# Patient Record
Sex: MALE | Race: BLACK OR AFRICAN AMERICAN | NOT HISPANIC OR LATINO | Employment: OTHER | URBAN - METROPOLITAN AREA
[De-identification: names, ages, dates, MRNs, and addresses within clinical notes are randomized per-mention and may not be internally consistent; named-entity substitution may affect disease eponyms.]

---

## 2019-09-09 ENCOUNTER — OFFICE VISIT (OUTPATIENT)
Dept: PRIMARY CARE CLINIC | Facility: CLINIC | Age: 49
End: 2019-09-09
Payer: COMMERCIAL

## 2019-09-09 VITALS
SYSTOLIC BLOOD PRESSURE: 128 MMHG | WEIGHT: 219.38 LBS | HEIGHT: 70 IN | BODY MASS INDEX: 31.41 KG/M2 | DIASTOLIC BLOOD PRESSURE: 80 MMHG | OXYGEN SATURATION: 96 % | TEMPERATURE: 98 F | HEART RATE: 75 BPM

## 2019-09-09 DIAGNOSIS — I10 ESSENTIAL HYPERTENSION: Primary | ICD-10-CM

## 2019-09-09 DIAGNOSIS — L30.9 DERMATITIS: ICD-10-CM

## 2019-09-09 DIAGNOSIS — Z12.11 SCREEN FOR COLON CANCER: ICD-10-CM

## 2019-09-09 DIAGNOSIS — Z00.00 ANNUAL PHYSICAL EXAM: ICD-10-CM

## 2019-09-09 PROCEDURE — 99999 PR PBB SHADOW E&M-NEW PATIENT-LVL III: ICD-10-PCS | Mod: PBBFAC,,, | Performed by: INTERNAL MEDICINE

## 2019-09-09 PROCEDURE — 99396 PR PREVENTIVE VISIT,EST,40-64: ICD-10-PCS | Mod: S$GLB,,, | Performed by: INTERNAL MEDICINE

## 2019-09-09 PROCEDURE — 99396 PREV VISIT EST AGE 40-64: CPT | Mod: S$GLB,,, | Performed by: INTERNAL MEDICINE

## 2019-09-09 PROCEDURE — 99999 PR PBB SHADOW E&M-NEW PATIENT-LVL III: CPT | Mod: PBBFAC,,, | Performed by: INTERNAL MEDICINE

## 2019-09-09 RX ORDER — IRBESARTAN AND HYDROCHLOROTHIAZIDE 150; 12.5 MG/1; MG/1
TABLET, FILM COATED ORAL
Refills: 1 | COMMUNITY
Start: 2019-08-01 | End: 2019-09-09 | Stop reason: SDUPTHER

## 2019-09-09 RX ORDER — IRBESARTAN AND HYDROCHLOROTHIAZIDE 150; 12.5 MG/1; MG/1
1 TABLET, FILM COATED ORAL DAILY
Qty: 90 TABLET | Refills: 3 | Status: SHIPPED | OUTPATIENT
Start: 2019-09-09 | End: 2020-09-23 | Stop reason: SDUPTHER

## 2019-09-09 RX ORDER — TRIAMCINOLONE ACETONIDE 1 MG/G
CREAM TOPICAL 2 TIMES DAILY
Qty: 80 G | Refills: 6 | Status: ON HOLD | OUTPATIENT
Start: 2019-09-09 | End: 2020-11-14 | Stop reason: HOSPADM

## 2019-09-09 NOTE — PROGRESS NOTES
[facility] Note    Chief Complaint      Chief Complaint   Patient presents with    Annual Exam    Rash     right leg       History of Present Illness      Matthew Phoenix is a 48 y.o. male with chronic conditions of HTN who presents today for: re-estabilsh care from  and annual preventative visit.  Complains of rash on right shin, pruritic present for the past few months.    HTN: BP at goal on irbesartan-hctz.    Diet: Prepares own food mostly.  LImiting fatty foods and carbs.  Drinks plenty water.  Exercise: Has been riding bike frequently.    Denies drinking and driving, drinking more than 4 drinks on occasion, drug use.      Past Medical History:  No past medical history on file.    Past Surgical History:   has no past surgical history on file.    Family History:  family history is not on file.     Social History:  Social History     Tobacco Use    Smoking status: Never Smoker   Substance Use Topics    Alcohol use: Yes    Drug use: Not on file       Review of Systems   Constitutional: Negative for chills, fever and malaise/fatigue.   Respiratory: Negative for shortness of breath.    Cardiovascular: Negative for chest pain.   Gastrointestinal: Negative for constipation, diarrhea, nausea and vomiting.   Skin: Negative for rash.   Neurological: Negative for weakness.        Medications:  Outpatient Encounter Medications as of 9/9/2019   Medication Sig Dispense Refill    irbesartan-hydrochlorothiazide (AVALIDE) 150-12.5 mg per tablet Take 1 tablet by mouth once daily. 90 tablet 3    [DISCONTINUED] irbesartan-hydrochlorothiazide (AVALIDE) 150-12.5 mg per tablet TK 1 T PO D  1    triamcinolone acetonide 0.1% (KENALOG) 0.1 % cream Apply topically 2 (two) times daily. 80 g 6     No facility-administered encounter medications on file as of 9/9/2019.        Allergies:  Review of patient's allergies indicates:   Allergen Reactions    Penicillins        Health Maintenance:    There is no  "immunization history on file for this patient.   Health Maintenance   Topic Date Due    Lipid Panel  1970    TETANUS VACCINE  10/06/1987      Flu shot declines.  Td 5-6 yrs ago.  Shingles vaccine due age 59. Pneumonia vaccine due age 64.    Cscope due at age 49.    Physical Exam      Vital Signs  Temp: 98.1 °F (36.7 °C)  Temp src: Oral  Pulse: 75  SpO2: 96 %  BP: 128/80  BP Location: Right arm  Patient Position: Sitting  Height and Weight  Height: 5' 10" (177.8 cm)  Weight: 99.5 kg (219 lb 5.7 oz)  BSA (Calculated - sq m): 2.22 sq meters  BMI (Calculated): 31.5  Weight in (lb) to have BMI = 25: 173.9]    Physical Exam   Constitutional: He appears well-developed and well-nourished.   HENT:   Head: Normocephalic and atraumatic.   Right Ear: External ear normal.   Left Ear: External ear normal.   Mouth/Throat: Oropharynx is clear and moist.   Eyes: Pupils are equal, round, and reactive to light. Conjunctivae and EOM are normal.   Cardiovascular: Normal rate, regular rhythm, normal heart sounds and intact distal pulses.   No murmur heard.  Pulmonary/Chest: Effort normal and breath sounds normal. He has no wheezes. He has no rales.   Abdominal: Soft. Bowel sounds are normal. He exhibits no distension and no abdominal bruit. There is no hepatosplenomegaly. There is no tenderness.   Skin: Rash (hyperpigmented plaque with areas of excoriation, right anterior shin) noted.   Vitals reviewed.       Laboratory:  CBC:      CMP:        Invalid input(s): CREATININ  URINALYSIS:       LIPIDS:      TSH:      A1C:        Assessment/Plan     Matthew Phoenix is a 48 y.o.male with:    1. Annual physical exam  - irbesartan-hydrochlorothiazide (AVALIDE) 150-12.5 mg per tablet; Take 1 tablet by mouth once daily.  Dispense: 90 tablet; Refill: 3  - CBC auto differential; Future  - Comprehensive metabolic panel; Future  - Lipid panel; Future  - PSA, Screening; Future  - TSH; Future  - T4, free; Future  - CBC auto differential  - " Comprehensive metabolic panel  - Lipid panel  - PSA, Screening  - TSH  - T4, free  Discussed diet and exercise, vaccines and cancer screening, risk factors.  Screening labs ordered.     2. Essential hypertension  - irbesartan-hydrochlorothiazide (AVALIDE) 150-12.5 mg per tablet; Take 1 tablet by mouth once daily.  Dispense: 90 tablet; Refill: 3  - CBC auto differential; Future  - Comprehensive metabolic panel; Future  - Lipid panel; Future  - PSA, Screening; Future  - TSH; Future  - T4, free; Future  - CBC auto differential  - Comprehensive metabolic panel  - Lipid panel  - PSA, Screening  - TSH  - T4, free  Continue current meds.    3. Dermatitis  - triamcinolone acetonide 0.1% (KENALOG) 0.1 % cream; Apply topically 2 (two) times daily.  Dispense: 80 g; Refill: 6      Chronic conditions status updated as per HPI.  Other than changes above, cont current medications and maintain follow up with specialists.  Return to clinic in 12 months.    Jordi White MD  [facility]

## 2019-09-20 ENCOUNTER — TELEPHONE (OUTPATIENT)
Dept: PRIMARY CARE CLINIC | Facility: CLINIC | Age: 49
End: 2019-09-20

## 2019-09-20 DIAGNOSIS — I10 ESSENTIAL HYPERTENSION: ICD-10-CM

## 2019-09-20 DIAGNOSIS — Z00.00 ANNUAL PHYSICAL EXAM: Primary | ICD-10-CM

## 2019-11-15 ENCOUNTER — TELEPHONE (OUTPATIENT)
Dept: FAMILY MEDICINE | Facility: CLINIC | Age: 49
End: 2019-11-15

## 2019-11-15 ENCOUNTER — TELEPHONE (OUTPATIENT)
Dept: PRIMARY CARE CLINIC | Facility: CLINIC | Age: 49
End: 2019-11-15

## 2019-11-15 DIAGNOSIS — L30.9 DERMATITIS: Primary | ICD-10-CM

## 2020-01-06 ENCOUNTER — INITIAL CONSULT (OUTPATIENT)
Dept: DERMATOLOGY | Facility: CLINIC | Age: 50
End: 2020-01-06
Payer: COMMERCIAL

## 2020-01-06 DIAGNOSIS — L28.0 LSC (LICHEN SIMPLEX CHRONICUS): Primary | ICD-10-CM

## 2020-01-06 PROCEDURE — 99999 PR PBB SHADOW E&M-EST. PATIENT-LVL III: CPT | Mod: PBBFAC,,, | Performed by: PHYSICIAN ASSISTANT

## 2020-01-06 PROCEDURE — 99999 PR PBB SHADOW E&M-EST. PATIENT-LVL III: ICD-10-PCS | Mod: PBBFAC,,, | Performed by: PHYSICIAN ASSISTANT

## 2020-01-06 PROCEDURE — 99202 PR OFFICE/OUTPT VISIT, NEW, LEVL II, 15-29 MIN: ICD-10-PCS | Mod: S$GLB,,, | Performed by: PHYSICIAN ASSISTANT

## 2020-01-06 PROCEDURE — 99202 OFFICE O/P NEW SF 15 MIN: CPT | Mod: S$GLB,,, | Performed by: PHYSICIAN ASSISTANT

## 2020-01-06 NOTE — PATIENT INSTRUCTIONS
Discussed with patient the importance of not manipulating skin lesions. Trauma often exacerbates condition. Trimming nails is recommended to avoid puncturing skin.     XEROSIS (DRY SKIN)        1. Definition    Xerosis is the term for dry skin.  We all have a natural oil coating over our skin produced by the skin oil glands.  If this oil is removed, the skin becomes dry which can lead to cracking, which can lead to inflammation.  Xerosis is usually a long-term problem that recurs often, especially in the winter.    2. Cause     Long hot baths or showers can remove our natural oil and lead to xerosis.  One should never take more than one bath or shower a day and for no longer than ten minutes.   Use of harsh soaps such as Zest, Dial, and Ivory can worsen and cause xerosis.   Cold winter weather worsens xerosis because the amount of moisture contained in cold air is much less than the amount of moisture in warm air.    3. Treatment     Treatment is intended to restore the natural oil to your skin.  Keep the skin lubricated.     Do not take more than one bath or shower a day.  Use lukewarm water, not hot.  Hot water dries out the skin.     Use a gentle moisturizing soap such as Cetaphil soap, Oil of Olay, Dove, Basis, Ivory moisture care, Restoraderm cleanser.     When toweling dry, dont rub.  Blot the skin so there is still some water left on the skin.  You should apply a moisturizing cream to all of the skin such as Cerave cream, Cetaphil cream, Restoraderm or Eucerin Original Formula cream.   Alpha hydroxyacid lotions, i.e., AmLactin, also work very well for preventing dry skin, but may burn when used on inflamed or reddened skin.     If you like to swim during the winter months, you should not use soap when getting out of the pool.  When you have finished swimming, rinse off the chlorine with cool to warm water.  If this will be the only shower of the day, then you may use Cetaphil or another mild soap to  cleanse your skin.  After the shower, apply a moisturizing cream to all of the skin as above.        [address] [place] [address]/ [phone] FAX/ [email or web address]

## 2020-01-06 NOTE — PROGRESS NOTES
Subjective:       Patient ID:  Matthew Phoenix is a 49 y.o. male who presents for   Chief Complaint   Patient presents with    Eczema     Pt presents today for dark pigmented spots lower legs, x 1 month, itching at times, Tx. peroxide, alcohol      Rash  - Initial  Affected locations: right lower leg  Duration: 5 months  Signs / symptoms: itching and dryness (darker than surrounding skin)  Timing: worse at night  Aggravated by: scratching (admits to habit of scratching with his other foot when laying in bed nightly)  Treatments tried: alcohol and peroxide qhs, was rx'd TAC crm - used bid x few months without improvement.    Pt showers bid with hot water and Dove soap. Does not moisturize.    Review of Systems   Constitutional: Negative for fever and chills.   Skin: Positive for itching, rash and dry skin.        No hx of AD or psoriasis   Hematologic/Lymphatic: Does not bruise/bleed easily.        Objective:    Physical Exam   Constitutional: He appears well-developed and well-nourished. No distress.   Neurological: He is alert and oriented to person, place, and time. He is not disoriented.   Psychiatric: He has a normal mood and affect.   Skin:   Areas Examined (abnormalities noted in diagram):   RLE Inspected  LLE Inspection Performed              Diagram Legend     Erythematous scaling macule/papule c/w actinic keratosis       Vascular papule c/w angioma      Pigmented verrucoid papule/plaque c/w seborrheic keratosis      Yellow umbilicated papule c/w sebaceous hyperplasia      Irregularly shaped tan macule c/w lentigo     1-2 mm smooth white papules consistent with Milia      Movable subcutaneous cyst with punctum c/w epidermal inclusion cyst      Subcutaneous movable cyst c/w pilar cyst      Firm pink to brown papule c/w dermatofibroma      Pedunculated fleshy papule(s) c/w skin tag(s)      Evenly pigmented macule c/w junctional nevus     Mildly variegated pigmented, slightly irregular-bordered macule c/w  mildly atypical nevus      Flesh colored to evenly pigmented papule c/w intradermal nevus       Pink pearly papule/plaque c/w basal cell carcinoma      Erythematous hyperkeratotic cursted plaque c/w SCC      Surgical scar with no sign of skin cancer recurrence      Open and closed comedones      Inflammatory papules and pustules      Verrucoid papule consistent consistent with wart     Erythematous eczematous patches and plaques     Dystrophic onycholytic nail with subungual debris c/w onychomycosis     Umbilicated papule    Erythematous-base heme-crusted tan verrucoid plaque consistent with inflamed seborrheic keratosis     Erythematous Silvery Scaling Plaque c/w Psoriasis     See annotation    Assessment / Plan:      LSC (lichen simplex chronicus)  Good skin care regimen discussed including limiting to one bath or shower/day, using lukewarm water with mild soap and moisturizing cream (CeraVe) to skin 1 - 2x/day. Brochure was provided and reviewed with patient.    Discussed with patient the importance of not manipulating skin lesions. Trauma often exacerbates condition. Trimming nails is recommended to avoid puncturing skin.     D/c alcohol and peroxide to the area.         Follow up if symptoms worsen or fail to improve.

## 2020-01-06 NOTE — LETTER
January 6, 2020      Jordi White MD  [address] [address]  [address]  [facility] [place] [address]           [facility]  [address]  [place] [address]  Phone: [phone]  Fax: [phone]          Patient: Matthew Phoenix   MR Number: [id number]   YOB: 1970   Date of Visit: 1/6/2020       Dear Dr. Jordi White:    Thank you for referring Matthew Phoenix to me for evaluation. Attached you will find relevant portions of my assessment and plan of care.    If you have questions, please do not hesitate to call me. I look forward to following Matthew Phoenix along with you.    Sincerely,    Temi Mason PA-C    Enclosure  CC:  No Recipients    If you would like to receive this communication electronically, please contact [email or web address] or [phone] to request more information on [employer] access.    For providers and/or their staff who would like to refer a patient to [facility], please contact us through our one-stop-shop provider referral line, [facility], at [phone].    If you feel you have received this communication in error or would no longer like to receive these types of communications, please e-mail [email or web address]

## 2020-03-04 LAB
ALBUMIN SERPL-MCNC: 4.5 G/DL (ref 4–5)
ALBUMIN/GLOB SERPL: 1.7 {RATIO} (ref 1.2–2.2)
ALP SERPL-CCNC: 53 IU/L (ref 39–117)
ALT SERPL-CCNC: 22 IU/L (ref 0–44)
AST SERPL-CCNC: 24 IU/L (ref 0–40)
BASOPHILS # BLD AUTO: 0 X10E3/UL (ref 0–0.2)
BASOPHILS NFR BLD AUTO: 0 %
BILIRUB SERPL-MCNC: 0.5 MG/DL (ref 0–1.2)
BUN SERPL-MCNC: 14 MG/DL (ref 6–24)
BUN/CREAT SERPL: 13 (ref 9–20)
CALCIUM SERPL-MCNC: 9.1 MG/DL (ref 8.7–10.2)
CHLORIDE SERPL-SCNC: 104 MMOL/L (ref 96–106)
CHOLEST SERPL-MCNC: 197 MG/DL (ref 100–199)
CO2 SERPL-SCNC: 19 MMOL/L (ref 20–29)
CREAT SERPL-MCNC: 1.07 MG/DL (ref 0.76–1.27)
EOSINOPHIL # BLD AUTO: 0.1 X10E3/UL (ref 0–0.4)
EOSINOPHIL NFR BLD AUTO: 2 %
ERYTHROCYTE [DISTWIDTH] IN BLOOD BY AUTOMATED COUNT: 15.1 % (ref 11.6–15.4)
GLOBULIN SER CALC-MCNC: 2.7 G/DL (ref 1.5–4.5)
GLUCOSE SERPL-MCNC: 117 MG/DL (ref 65–99)
HCT VFR BLD AUTO: 43.6 % (ref 37.5–51)
HDLC SERPL-MCNC: 45 MG/DL
HGB BLD-MCNC: 14.3 G/DL (ref 13–17.7)
IMM GRANULOCYTES # BLD AUTO: 0 X10E3/UL (ref 0–0.1)
IMM GRANULOCYTES NFR BLD AUTO: 0 %
LDLC SERPL CALC-MCNC: 130 MG/DL (ref 0–99)
LYMPHOCYTES # BLD AUTO: 1.9 X10E3/UL (ref 0.7–3.1)
LYMPHOCYTES NFR BLD AUTO: 47 %
MCH RBC QN AUTO: 29.6 PG (ref 26.6–33)
MCHC RBC AUTO-ENTMCNC: 32.8 G/DL (ref 31.5–35.7)
MCV RBC AUTO: 90 FL (ref 79–97)
MONOCYTES # BLD AUTO: 0.4 X10E3/UL (ref 0.1–0.9)
MONOCYTES NFR BLD AUTO: 10 %
NEUTROPHILS # BLD AUTO: 1.7 X10E3/UL (ref 1.4–7)
NEUTROPHILS NFR BLD AUTO: 41 %
PLATELET # BLD AUTO: 196 X10E3/UL (ref 150–450)
POTASSIUM SERPL-SCNC: 4.1 MMOL/L (ref 3.5–5.2)
PROT SERPL-MCNC: 7.2 G/DL (ref 6–8.5)
PSA SERPL-MCNC: 1.2 NG/ML (ref 0–4)
RBC # BLD AUTO: 4.83 X10E6/UL (ref 4.14–5.8)
SODIUM SERPL-SCNC: 141 MMOL/L (ref 134–144)
T4 FREE SERPL-MCNC: 1.18 NG/DL (ref 0.82–1.77)
TRIGL SERPL-MCNC: 108 MG/DL (ref 0–149)
TSH SERPL DL<=0.005 MIU/L-ACNC: 5.13 UIU/ML (ref 0.45–4.5)
VLDLC SERPL CALC-MCNC: 22 MG/DL (ref 5–40)
WBC # BLD AUTO: 4.1 X10E3/UL (ref 3.4–10.8)

## 2020-03-09 ENCOUNTER — OFFICE VISIT (OUTPATIENT)
Dept: PRIMARY CARE CLINIC | Facility: CLINIC | Age: 50
End: 2020-03-09
Payer: COMMERCIAL

## 2020-03-09 VITALS
HEART RATE: 70 BPM | RESPIRATION RATE: 18 BRPM | WEIGHT: 217.81 LBS | OXYGEN SATURATION: 98 % | TEMPERATURE: 98 F | HEIGHT: 70 IN | BODY MASS INDEX: 31.18 KG/M2 | DIASTOLIC BLOOD PRESSURE: 65 MMHG | SYSTOLIC BLOOD PRESSURE: 135 MMHG

## 2020-03-09 DIAGNOSIS — L28.0 LICHEN SIMPLEX CHRONICUS: ICD-10-CM

## 2020-03-09 DIAGNOSIS — I10 ESSENTIAL HYPERTENSION: Primary | ICD-10-CM

## 2020-03-09 DIAGNOSIS — R73.01 IMPAIRED FASTING GLUCOSE: ICD-10-CM

## 2020-03-09 PROCEDURE — 99999 PR PBB SHADOW E&M-EST. PATIENT-LVL III: CPT | Mod: PBBFAC,,, | Performed by: INTERNAL MEDICINE

## 2020-03-09 PROCEDURE — 99999 PR PBB SHADOW E&M-EST. PATIENT-LVL III: ICD-10-PCS | Mod: PBBFAC,,, | Performed by: INTERNAL MEDICINE

## 2020-03-09 PROCEDURE — 99214 PR OFFICE/OUTPT VISIT, EST, LEVL IV, 30-39 MIN: ICD-10-PCS | Mod: S$GLB,,, | Performed by: INTERNAL MEDICINE

## 2020-03-09 PROCEDURE — 3075F PR MOST RECENT SYSTOLIC BLOOD PRESS GE 130-139MM HG: ICD-10-PCS | Mod: CPTII,S$GLB,, | Performed by: INTERNAL MEDICINE

## 2020-03-09 PROCEDURE — 3008F PR BODY MASS INDEX (BMI) DOCUMENTED: ICD-10-PCS | Mod: CPTII,S$GLB,, | Performed by: INTERNAL MEDICINE

## 2020-03-09 PROCEDURE — 99214 OFFICE O/P EST MOD 30 MIN: CPT | Mod: S$GLB,,, | Performed by: INTERNAL MEDICINE

## 2020-03-09 PROCEDURE — 3075F SYST BP GE 130 - 139MM HG: CPT | Mod: CPTII,S$GLB,, | Performed by: INTERNAL MEDICINE

## 2020-03-09 PROCEDURE — 3078F DIAST BP <80 MM HG: CPT | Mod: CPTII,S$GLB,, | Performed by: INTERNAL MEDICINE

## 2020-03-09 PROCEDURE — 3078F PR MOST RECENT DIASTOLIC BLOOD PRESSURE < 80 MM HG: ICD-10-PCS | Mod: CPTII,S$GLB,, | Performed by: INTERNAL MEDICINE

## 2020-03-09 PROCEDURE — 3008F BODY MASS INDEX DOCD: CPT | Mod: CPTII,S$GLB,, | Performed by: INTERNAL MEDICINE

## 2020-03-09 NOTE — PROGRESS NOTES
[facility] Note    Chief Complaint      Chief Complaint   Patient presents with    Follow-up       History of Present Illness      Matthew Phoenix is a 49 y.o. male with chronic conditions of HTN who presents today for: follow up chronic conditions and review abnormal labs.  Had labs which showed elevated blood sugar (117).  Has never been diagnosed with diabetes previously.    HTN: BP at goal on irbesartan-hctz.  Lichen simplex: Saw Dr. Mason.  Started on cerave cream which is improving.    Flu shot declines.  Td 5-6 yrs ago.  Shingles vaccine due age 59. Pneumonia vaccine due age 64.    Cscope due at age 49.    Past Medical History:  History reviewed. No pertinent past medical history.    Past Surgical History:   has no past surgical history on file.    Family History:  family history includes Eczema in his daughter.     Social History:  Social History     Tobacco Use    Smoking status: Never Smoker   Substance Use Topics    Alcohol use: Yes    Drug use: Not on file       Review of Systems   Constitutional: Negative for chills, fever and malaise/fatigue.   Respiratory: Negative for shortness of breath.    Cardiovascular: Negative for chest pain.   Gastrointestinal: Negative for constipation, diarrhea, nausea and vomiting.   Skin: Negative for rash.   Neurological: Negative for weakness.        Medications:  Outpatient Encounter Medications as of 3/9/2020   Medication Sig Dispense Refill    irbesartan-hydrochlorothiazide (AVALIDE) 150-12.5 mg per tablet Take 1 tablet by mouth once daily. 90 tablet 3    triamcinolone acetonide 0.1% (KENALOG) 0.1 % cream Apply topically 2 (two) times daily. (Patient not taking: Reported on 1/6/2020) 80 g 6     No facility-administered encounter medications on file as of 3/9/2020.        Allergies:  Review of patient's allergies indicates:   Allergen Reactions    Penicillins        Health Maintenance:    There is no immunization history on file for this  "patient.   Health Maintenance   Topic Date Due    TETANUS VACCINE  10/06/1987    Lipid Panel  03/03/2025    Colonoscopy  10/28/2029        Physical Exam      Vital Signs  Temp: 98 °F (36.7 °C)  Temp src: Oral  Pulse: 70  Resp: 18  SpO2: 98 %  BP: 135/65  BP Location: Left arm  Patient Position: Sitting  Pain Score: 0-No pain  Height and Weight  Height: 5' 10" (177.8 cm)  Weight: 98.8 kg (217 lb 13 oz)  BSA (Calculated - sq m): 2.21 sq meters  BMI (Calculated): 31.3  Weight in (lb) to have BMI = 25: 173.9]    Physical Exam   Constitutional: He appears well-developed and well-nourished.   HENT:   Head: Normocephalic and atraumatic.   Right Ear: External ear normal.   Left Ear: External ear normal.   Mouth/Throat: Oropharynx is clear and moist.   Eyes: Pupils are equal, round, and reactive to light. Conjunctivae and EOM are normal.   Cardiovascular: Normal rate, regular rhythm, normal heart sounds and intact distal pulses.   No murmur heard.  Pulmonary/Chest: Effort normal and breath sounds normal. He has no wheezes. He has no rales.   Abdominal: Soft. Bowel sounds are normal. He exhibits no distension and no abdominal bruit. There is no hepatosplenomegaly. There is no tenderness.   Vitals reviewed.       Laboratory:  CBC:  Recent Labs   Lab 03/03/20  0636   WBC 4.1   RBC 4.83   Hemoglobin 14.3   Hematocrit 43.6   Platelets 196   Mean Corpuscular Volume 90   Mean Corpuscular Hemoglobin 29.6   Mean Corpuscular Hemoglobin Conc 32.8     CMP:  Recent Labs   Lab 03/03/20  0636   Glucose 117 H   Calcium 9.1   Albumin 4.5   Total Protein 7.2   Sodium 141   Potassium 4.1   CO2 19 L   Chloride 104   BUN, Bld 14   Alkaline Phosphatase 53   ALT 22   AST 24   Total Bilirubin 0.5     URINALYSIS:       LIPIDS:  Recent Labs   Lab 03/03/20  0636   TSH 5.130 H   HDL 45   Cholesterol 197   Triglycerides 108   LDL Calculated 130 H     TSH:  Recent Labs   Lab 03/03/20  0636   TSH 5.130 H     A1C:        Assessment/Plan     Sachin" Phoenix is a 49 y.o.male with:    1. Essential hypertension  Continue current meds.    2. Impaired fasting glucose  - Hemoglobin A1c; Future  - Hemoglobin A1c  Will check A1C.  Counseled on diet.  3. Lichen simplex chronicus  Doing well on cerave.  Cont current treatment.    Chronic conditions status updated as per HPI.  Other than changes above, cont current medications and maintain follow up with specialists.  Return to clinic in 6 months.    Jordi White MD  [facility]

## 2020-03-12 ENCOUNTER — TELEPHONE (OUTPATIENT)
Dept: FAMILY MEDICINE | Facility: CLINIC | Age: 50
End: 2020-03-12

## 2020-03-12 DIAGNOSIS — R05.9 COUGH: Primary | ICD-10-CM

## 2020-03-12 NOTE — TELEPHONE ENCOUNTER
Spoke with patient. Pt calling in complains of nonproductive dry cough, x1 week. Pt states that he has only tried Nyquil OTC. Pt states that he would like something called in.   Informed patient that I would forward this information to Dr White and once he responds someone would give him a call back. Pt verbalizes understanding.

## 2020-03-12 NOTE — TELEPHONE ENCOUNTER
----- Message from Talon Hector sent at 3/12/2020  4:11 PM CDT -----  Contact: Ross (wife)[phone]  Ross state the patient have a dry cough and need to speak to the nurse about it. Please call and advise.

## 2020-03-13 RX ORDER — METHYLPREDNISOLONE 4 MG/1
TABLET ORAL
Qty: 1 PACKAGE | Refills: 0 | Status: SHIPPED | OUTPATIENT
Start: 2020-03-13 | End: 2020-04-03

## 2020-06-07 ENCOUNTER — HOSPITAL ENCOUNTER (EMERGENCY)
Facility: HOSPITAL | Age: 50
Discharge: HOME | End: 2020-06-07
Attending: EMERGENCY MEDICINE
Payer: COMMERCIAL

## 2020-06-07 VITALS
BODY MASS INDEX: 32.14 KG/M2 | TEMPERATURE: 99 F | OXYGEN SATURATION: 98 % | RESPIRATION RATE: 18 BRPM | HEIGHT: 69 IN | SYSTOLIC BLOOD PRESSURE: 170 MMHG | HEART RATE: 97 BPM | WEIGHT: 217 LBS | DIASTOLIC BLOOD PRESSURE: 94 MMHG

## 2020-06-07 DIAGNOSIS — M79.605 LEG PAIN, BILATERAL: Primary | ICD-10-CM

## 2020-06-07 DIAGNOSIS — M79.604 LEG PAIN, BILATERAL: Primary | ICD-10-CM

## 2020-06-07 PROCEDURE — 99284 EMERGENCY DEPT VISIT MOD MDM: CPT | Mod: 25

## 2020-06-07 PROCEDURE — 96372 THER/PROPH/DIAG INJ SC/IM: CPT

## 2020-06-07 PROCEDURE — 99284 PR EMERGENCY DEPT VISIT,LEVEL IV: ICD-10-PCS | Mod: ,,, | Performed by: PHYSICIAN ASSISTANT

## 2020-06-07 PROCEDURE — 99284 EMERGENCY DEPT VISIT MOD MDM: CPT | Mod: ,,, | Performed by: PHYSICIAN ASSISTANT

## 2020-06-07 PROCEDURE — 25000003 PHARM REV CODE 250: Performed by: PHYSICIAN ASSISTANT

## 2020-06-07 PROCEDURE — 63600175 PHARM REV CODE 636 W HCPCS: Performed by: PHYSICIAN ASSISTANT

## 2020-06-07 RX ORDER — METHOCARBAMOL 500 MG/1
1000 TABLET, FILM COATED ORAL 3 TIMES DAILY PRN
Qty: 26 TABLET | Refills: 0 | Status: SHIPPED | OUTPATIENT
Start: 2020-06-07 | End: 2020-06-12

## 2020-06-07 RX ORDER — NAPROXEN 500 MG/1
500 TABLET ORAL 2 TIMES DAILY WITH MEALS
Qty: 30 TABLET | Refills: 0 | Status: ON HOLD | OUTPATIENT
Start: 2020-06-07 | End: 2020-11-14 | Stop reason: HOSPADM

## 2020-06-07 RX ORDER — ACETAMINOPHEN 500 MG
1000 TABLET ORAL
Status: COMPLETED | OUTPATIENT
Start: 2020-06-07 | End: 2020-06-07

## 2020-06-07 RX ORDER — METHOCARBAMOL 500 MG/1
1000 TABLET, FILM COATED ORAL 3 TIMES DAILY PRN
Qty: 26 TABLET | Refills: 0 | Status: SHIPPED | OUTPATIENT
Start: 2020-06-07 | End: 2020-06-07 | Stop reason: SDUPTHER

## 2020-06-07 RX ORDER — ACETAMINOPHEN 500 MG
1000 TABLET ORAL
Status: DISCONTINUED | OUTPATIENT
Start: 2020-06-07 | End: 2020-06-07 | Stop reason: HOSPADM

## 2020-06-07 RX ORDER — KETOROLAC TROMETHAMINE 30 MG/ML
15 INJECTION, SOLUTION INTRAMUSCULAR; INTRAVENOUS
Status: COMPLETED | OUTPATIENT
Start: 2020-06-07 | End: 2020-06-07

## 2020-06-07 RX ORDER — METHOCARBAMOL 750 MG/1
1500 TABLET, FILM COATED ORAL
Status: COMPLETED | OUTPATIENT
Start: 2020-06-07 | End: 2020-06-07

## 2020-06-07 RX ORDER — NAPROXEN 500 MG/1
500 TABLET ORAL 2 TIMES DAILY WITH MEALS
Qty: 30 TABLET | Refills: 0 | Status: SHIPPED | OUTPATIENT
Start: 2020-06-07 | End: 2020-06-07 | Stop reason: SDUPTHER

## 2020-06-07 RX ADMIN — ACETAMINOPHEN 1000 MG: 500 TABLET ORAL at 04:06

## 2020-06-07 RX ADMIN — METHOCARBAMOL TABLETS 1500 MG: 750 TABLET, COATED ORAL at 04:06

## 2020-06-07 RX ADMIN — KETOROLAC TROMETHAMINE 15 MG: 30 INJECTION, SOLUTION INTRAMUSCULAR at 04:06

## 2020-06-07 NOTE — ED TRIAGE NOTES
Pt to ED with c/o of bilateral lower back and leg pain that started 2days ago. Pt stated its increasingly getting worse.

## 2020-06-07 NOTE — ED NOTES
LOC: The patient is awake, alert, and oriented to place, time, situation. Affect is appropriate.  Speech is appropriate and clear.     APPEARANCE: Patient resting comfortably in no acute distress.  Patient is clean and well groomed.    SKIN: The skin is warm and dry; color consistent with ethnicity.  Patient has normal skin turgor and moist mucus membranes.  Skin intact; no breakdown or bruising noted.     MUSCULOSKELETAL: Patient moving upper and lower extremities without difficulty.  Pt c/o bilateral leg pain.     RESPIRATORY: Airway is open and patent. Respirations spontaneous, even, easy, and non-labored.  Patient has a normal effort and rate.  No accessory muscle use noted. Denies cough.     CARDIAC:  Normal rhythm and rate noted.  No peripheral edema noted. No complaints of chest pain.      ABDOMEN: Soft and non tender to palpation.  No distention noted.     NEUROLOGIC: Eyes open spontaneously.  Behavior appropriate to situation.  Follows commands; facial expression symmetrical.  Purposeful motor response noted; normal sensation in all extremities.

## 2020-06-07 NOTE — ED PROVIDER NOTES
"Encounter Date: 6/7/2020       History     Chief Complaint   Patient presents with    Back Pain     lower back pain since last night and pain in both legs. no trauma or fall      49-year-old male with hypertension presents for pain in his bilateral posterior thighs for 1 day.  He is unable to describe the pain states this "just hurts".  He denies any provoking factors, no trauma, new exercises or bending.  He took a Flexeril prior to arrival which did improve his symptoms.  Pain is worse with bending forward.  He denies any associated symptoms, no back pain, numbness/tingling/weakness, leg swelling, abdominal pain or fever.  No loss of bowel or bladder function, no groin numbness.  He is able to ambulate without difficulty.        Review of patient's allergies indicates:   Allergen Reactions    Penicillins      PMH:  Hypertension  History reviewed. No pertinent surgical history.  Family History   Problem Relation Age of Onset    Eczema Daughter      Social History     Tobacco Use    Smoking status: Never Smoker   Substance Use Topics    Alcohol use: Yes    Drug use: Not on file     Review of Systems   Constitutional: Negative for fever.   HENT: Negative for sore throat.    Respiratory: Negative for shortness of breath.    Cardiovascular: Negative for chest pain.   Gastrointestinal: Negative for nausea.   Genitourinary: Negative for dysuria.   Musculoskeletal: Positive for myalgias. Negative for back pain, gait problem and joint swelling.   Skin: Negative for rash.   Neurological: Negative for weakness.   Hematological: Does not bruise/bleed easily.       Physical Exam     Initial Vitals [06/07/20 1457]   BP Pulse Resp Temp SpO2   (!) 170/94 97 18 99.3 °F (37.4 °C) 98 %      MAP       --         Physical Exam    Nursing note and vitals reviewed.  Constitutional: He appears well-developed and well-nourished. He is not diaphoretic. No distress.   HENT:   Head: Normocephalic and atraumatic.   Eyes: EOM are normal. " Pupils are equal, round, and reactive to light.   Neck: Normal range of motion. Neck supple.   Cardiovascular: Normal rate, regular rhythm, normal heart sounds and intact distal pulses. Exam reveals no gallop and no friction rub.    No murmur heard.  Pulmonary/Chest: Breath sounds normal. No respiratory distress. He has no wheezes. He has no rales. He exhibits no tenderness.   Musculoskeletal: Normal range of motion.   No posterior midline tenderness to palpation of C, T or L-spine.  Normal appearing back and bilateral thighs.  No swelling, skin changes or tenderness   Neurological: He is alert and oriented to person, place, and time. He has normal strength. No sensory deficit.   Skin: Skin is warm and dry.   Psychiatric: He has a normal mood and affect.         ED Course   Procedures  Labs Reviewed - No data to display       Imaging Results    None          Medical Decision Making:   History:   Old Medical Records: I decided to obtain old medical records.  Old Records Summarized: records from clinic visits.       <> Summary of Records: No recent ED visits or admissions  Initial Assessment:   49-year-old male presenting for atraumatic pain in his bilateral posterior thighs.  He is hypertensive 170/94 with otherwise normal vitals.  He has no tenderness to his back or thighs or any skin changes or swelling.  Normal strength and sensation.  Differential Diagnosis:   Muscle strain  Spinal stenosis  No weakness or numbness to suggest neurological compromise  No shooting electric pain is suggest sciatica  ED Management:  Will give Toradol, Tylenol, Robaxin and discharge with naproxen and Robaxin.    Instructed the patient to follow up with his PCP and return to the ED for any worsening symptoms. Stressed the importance of follow-up, strict ED return precautions given.  Patient voiced understanding and is comfortable with discharge.                                 Clinical Impression:       ICD-10-CM ICD-9-CM   1. Leg  pain, bilateral M79.604 729.5    M79.605          Disposition:   Disposition: Discharged  Condition: Stable                        Merary House PA-C  06/07/20 0679

## 2020-06-07 NOTE — DISCHARGE INSTRUCTIONS
Diagnosis:  Leg pain    I suspect that the pain in your legs may be due to a strained muscle or potentially narrowing of your spinal cord.  I am prescribing anti-inflammatory medicine as well as muscle relaxants.  You should also take Tylenol in addition to this, up to 3 g daily which is 6 of the 500 mg extra strength tablets.  Do not take muscle relaxants if you are driving or drinking alcohol.  The best to keep stretching to help relax your muscles.    Please schedule a follow-up appointment with your primary care doctor within the next week.  If you start to have weakness or numbness in your legs or loss of bowel or bladder function, please return to the emergency department.

## 2020-09-14 ENCOUNTER — OFFICE VISIT (OUTPATIENT)
Dept: PRIMARY CARE CLINIC | Facility: CLINIC | Age: 50
End: 2020-09-14
Payer: COMMERCIAL

## 2020-09-14 VITALS
DIASTOLIC BLOOD PRESSURE: 102 MMHG | SYSTOLIC BLOOD PRESSURE: 140 MMHG | HEIGHT: 69 IN | WEIGHT: 215.38 LBS | HEART RATE: 72 BPM | BODY MASS INDEX: 31.9 KG/M2

## 2020-09-14 DIAGNOSIS — E78.2 HYPERLIPIDEMIA, MIXED: ICD-10-CM

## 2020-09-14 DIAGNOSIS — I10 ESSENTIAL HYPERTENSION: Primary | ICD-10-CM

## 2020-09-14 DIAGNOSIS — Z11.59 SCREENING FOR VIRAL DISEASE: ICD-10-CM

## 2020-09-14 DIAGNOSIS — L28.0 LICHEN SIMPLEX CHRONICUS: ICD-10-CM

## 2020-09-14 DIAGNOSIS — E03.8 SUBCLINICAL HYPOTHYROIDISM: ICD-10-CM

## 2020-09-14 DIAGNOSIS — R73.01 IMPAIRED FASTING GLUCOSE: ICD-10-CM

## 2020-09-14 DIAGNOSIS — Z11.4 SCREENING FOR HIV (HUMAN IMMUNODEFICIENCY VIRUS): ICD-10-CM

## 2020-09-14 PROCEDURE — 3080F DIAST BP >= 90 MM HG: CPT | Mod: CPTII,S$GLB,, | Performed by: INTERNAL MEDICINE

## 2020-09-14 PROCEDURE — 3077F SYST BP >= 140 MM HG: CPT | Mod: CPTII,S$GLB,, | Performed by: INTERNAL MEDICINE

## 2020-09-14 PROCEDURE — 99214 PR OFFICE/OUTPT VISIT, EST, LEVL IV, 30-39 MIN: ICD-10-PCS | Mod: S$GLB,,, | Performed by: INTERNAL MEDICINE

## 2020-09-14 PROCEDURE — 99999 PR PBB SHADOW E&M-EST. PATIENT-LVL III: CPT | Mod: PBBFAC,,, | Performed by: INTERNAL MEDICINE

## 2020-09-14 PROCEDURE — 99214 OFFICE O/P EST MOD 30 MIN: CPT | Mod: S$GLB,,, | Performed by: INTERNAL MEDICINE

## 2020-09-14 PROCEDURE — 3080F PR MOST RECENT DIASTOLIC BLOOD PRESSURE >= 90 MM HG: ICD-10-PCS | Mod: CPTII,S$GLB,, | Performed by: INTERNAL MEDICINE

## 2020-09-14 PROCEDURE — 3077F PR MOST RECENT SYSTOLIC BLOOD PRESSURE >= 140 MM HG: ICD-10-PCS | Mod: CPTII,S$GLB,, | Performed by: INTERNAL MEDICINE

## 2020-09-14 PROCEDURE — 3008F PR BODY MASS INDEX (BMI) DOCUMENTED: ICD-10-PCS | Mod: CPTII,S$GLB,, | Performed by: INTERNAL MEDICINE

## 2020-09-14 PROCEDURE — 99999 PR PBB SHADOW E&M-EST. PATIENT-LVL III: ICD-10-PCS | Mod: PBBFAC,,, | Performed by: INTERNAL MEDICINE

## 2020-09-14 PROCEDURE — 3008F BODY MASS INDEX DOCD: CPT | Mod: CPTII,S$GLB,, | Performed by: INTERNAL MEDICINE

## 2020-09-14 NOTE — LETTER
September 14, 2020    Matthew Phoenix  [address] [address]             [facility] - Primary care  [facility] [address] [facility] [address]  Phone: [phone]  Fax: [phone] To whom it may concern:    Mr. Phoenix is a patient under my care as primary care physician.  As such, he has been following with me for a diagnosis of chronic venous insufficiency.  Due to this medical problem, he is susceptible for swelling of the leg if standing for prolonged periods of time.  Please consider making any reasonable accomodation for this in regards to his assigned duties for his community service plan.  Please contact my office for any additional information.      Respectfully,        Jordi White M.D.

## 2020-09-22 ENCOUNTER — TELEPHONE (OUTPATIENT)
Dept: FAMILY MEDICINE | Facility: CLINIC | Age: 50
End: 2020-09-22

## 2020-09-22 DIAGNOSIS — K21.9 GERD WITHOUT ESOPHAGITIS: Primary | ICD-10-CM

## 2020-09-22 NOTE — TELEPHONE ENCOUNTER
Care Due:                  Date            Visit Type   Department     Provider  --------------------------------------------------------------------------------                                ESTABLISHED                              PATIENT  Last Visit: 09-      SHORT        None Found     Jordi Ferrari  Next Visit: 03-      None         None Found     Jordi Ferrari                                                            Last  Test          Frequency    Reason                     Performed    Due Date  --------------------------------------------------------------------------------    Cr..........  6 months...  irbesartan-hydrochlorothi  03- 08-                             azide....................    K...........  6 months...  irbesartan-hydrochlorothi  03- 08-                             azide....................    Na..........  6 months...  irbesartan-hydrochlorothi  03- 08-                             azide....................    Powered by [employer]. Reference number: [id number]. 9/22/2020 12:01:22 PM   CDT

## 2020-09-22 NOTE — TELEPHONE ENCOUNTER
Spoke with patient. Pt complains of swallowing difficulty x3 days. Pt states that it feels like the food gets stuck in his chest after swallowing, pt denies having this feeling presently.  Pt denies having problems with thin liquids, rice, or vegetables, pt only having problems with meat. Pt denies cutting meat prior to eating, and has no difficulty chewing.   Informed patient that I would forward this message to Dr White and once he responds, someone would give him a call with his recommendations.

## 2020-09-22 NOTE — TELEPHONE ENCOUNTER
----- Message from Kimmy Arenas sent at 9/22/2020  8:50 AM CDT -----  Contact: patient [phone]  Patient's wife called because he is having trouble swallowing solid food x 3 days and she is concerned . Pt just saw  on 9/14/20 and would like advice about what to do,  Please call patient asap.

## 2020-09-23 DIAGNOSIS — I10 ESSENTIAL HYPERTENSION: ICD-10-CM

## 2020-09-23 DIAGNOSIS — Z00.00 ANNUAL PHYSICAL EXAM: ICD-10-CM

## 2020-09-23 LAB
ALBUMIN SERPL-MCNC: 4.2 G/DL (ref 4–5)
ALBUMIN/GLOB SERPL: 1.6 {RATIO} (ref 1.2–2.2)
ALP SERPL-CCNC: 51 IU/L (ref 39–117)
ALT SERPL-CCNC: 20 IU/L (ref 0–44)
AST SERPL-CCNC: 21 IU/L (ref 0–40)
BILIRUB SERPL-MCNC: 0.6 MG/DL (ref 0–1.2)
BUN SERPL-MCNC: 12 MG/DL (ref 6–24)
BUN/CREAT SERPL: 10 (ref 9–20)
CALCIUM SERPL-MCNC: 9.1 MG/DL (ref 8.7–10.2)
CHLORIDE SERPL-SCNC: 99 MMOL/L (ref 96–106)
CHOLEST SERPL-MCNC: 168 MG/DL (ref 100–199)
CO2 SERPL-SCNC: 26 MMOL/L (ref 20–29)
CREAT SERPL-MCNC: 1.15 MG/DL (ref 0.76–1.27)
GLOBULIN SER CALC-MCNC: 2.7 G/DL (ref 1.5–4.5)
GLUCOSE SERPL-MCNC: 101 MG/DL (ref 65–99)
HBA1C MFR BLD: 6.3 % (ref 4.8–5.6)
HCV AB S/CO SERPL IA: 0.1 S/CO RATIO (ref 0–0.9)
HDLC SERPL-MCNC: 43 MG/DL
HIV 1+2 AB+HIV1 P24 AG SERPL QL IA: NON REACTIVE
LDLC SERPL CALC-MCNC: 104 MG/DL (ref 0–99)
POTASSIUM SERPL-SCNC: 4.5 MMOL/L (ref 3.5–5.2)
PROT SERPL-MCNC: 6.9 G/DL (ref 6–8.5)
SODIUM SERPL-SCNC: 140 MMOL/L (ref 134–144)
T4 FREE SERPL-MCNC: 1.01 NG/DL (ref 0.82–1.77)
TRIGL SERPL-MCNC: 114 MG/DL (ref 0–149)
TSH SERPL DL<=0.005 MIU/L-ACNC: 6.74 UIU/ML (ref 0.45–4.5)
VLDLC SERPL CALC-MCNC: 21 MG/DL (ref 5–40)

## 2020-09-23 RX ORDER — PANTOPRAZOLE SODIUM 40 MG/1
40 TABLET, DELAYED RELEASE ORAL DAILY
Qty: 30 TABLET | Refills: 1 | Status: ON HOLD | OUTPATIENT
Start: 2020-09-23 | End: 2020-11-14 | Stop reason: HOSPADM

## 2020-09-23 RX ORDER — IRBESARTAN AND HYDROCHLOROTHIAZIDE 150; 12.5 MG/1; MG/1
1 TABLET, FILM COATED ORAL DAILY
Qty: 90 TABLET | Refills: 1 | Status: SHIPPED | OUTPATIENT
Start: 2020-09-23 | End: 2021-04-20 | Stop reason: SDUPTHER

## 2020-09-23 NOTE — TELEPHONE ENCOUNTER
No new care gaps identified.  Powered by [employer]. Reference number: [id number]. 9/23/2020 8:38:15 AM RAJT

## 2020-09-24 NOTE — PROGRESS NOTES
Refill Routing Note   Medication(s) are not appropriate for processing by [facility]:       - Required vitals are abnormal        Medication Therapy Plan: CDMR: BP elevated at LOV with PCP and at ED visit; abnormal labs defer  Medication reconciliation completed: No      Automatic Epic Generated Protocol Data:        Requested Prescriptions   Pending Prescriptions Disp Refills    irbesartan-hydrochlorothiazide (AVALIDE) 150-12.5 mg per tablet 90 tablet 1     Sig: Take 1 tablet by mouth once daily.       Cardiovascular: ARB + Diuretic Combos Failed - 9/23/2020  3:51 PM        Failed - Last BP in normal range within 360 days     BP Readings from Last 3 Encounters:   09/14/20 (!) 140/102   06/07/20 (!) 170/94   03/09/20 135/65              Failed - K in normal range and within 180 days     Potassium   Date Value Ref Range Status   09/22/2020 4.5 3.5 - 5.2 mmol/L Final   03/03/2020 4.1 3.5 - 5.2 mmol/L Final              Passed - Patient is at least 18 years old        Passed - Office Visit within last 12 months or future 90 days.     Recent Outpatient Visits            1 week ago Essential hypertension    [facility] - Primary care Jordi White MD    6 months ago Essential hypertension    [facility] - Primary care Jordi White MD    1 year ago Essential hypertension    [facility] - Primary care Jordi White MD          Future Appointments              In 5 days NURSE, [facility]    In 5 months Jordi White MD [facility]                Passed - Na is between 130 and 148 and within 180 days     Sodium   Date Value Ref Range Status   09/22/2020 140 134 - 144 mmol/L Final   03/03/2020 141 134 - 144 mmol/L Final              Passed - Cr is 1.3 or below and within 180 days     Creatinine   Date Value Ref Range Status   09/22/2020 1.15 0.76 - 1.27 mg/dL Final   03/03/2020 1.07 0.76 - 1.27 mg/dL Final               Passed - eGFR within 180 days     eGFR if non    Date Value Ref Range Status   09/22/2020 74 >59 mL/min/1.73 Final   03/03/2020 81 >59 mL/min/1.73 Final                    Appointments  past 12m or future 3m with PCP    Date Provider   Last Visit   9/14/2020 Jordi White MD   Next Visit   3/15/2021 Jordi White MD   ED visits in past 90 days: 0     Note composed:8:28 PM 09/23/2020

## 2020-09-28 ENCOUNTER — CLINICAL SUPPORT (OUTPATIENT)
Dept: PRIMARY CARE CLINIC | Facility: CLINIC | Age: 50
End: 2020-09-28
Payer: COMMERCIAL

## 2020-09-28 ENCOUNTER — TELEPHONE (OUTPATIENT)
Dept: PRIMARY CARE CLINIC | Facility: CLINIC | Age: 50
End: 2020-09-28

## 2020-09-28 VITALS — DIASTOLIC BLOOD PRESSURE: 88 MMHG | HEART RATE: 61 BPM | SYSTOLIC BLOOD PRESSURE: 130 MMHG | OXYGEN SATURATION: 95 %

## 2020-09-28 DIAGNOSIS — I10 ESSENTIAL HYPERTENSION: Primary | ICD-10-CM

## 2020-09-28 PROCEDURE — 99999 PR PBB SHADOW E&M-EST. PATIENT-LVL II: CPT | Mod: PBBFAC,,,

## 2020-09-28 PROCEDURE — 99999 PR PBB SHADOW E&M-EST. PATIENT-LVL II: ICD-10-PCS | Mod: PBBFAC,,,

## 2020-09-28 NOTE — TELEPHONE ENCOUNTER
Pt stated that the letter you wrote on 09/14 needs to say he can't do physical work for his community service.

## 2020-09-28 NOTE — TELEPHONE ENCOUNTER
----- Message from Lorna Rosenthal sent at 9/28/2020 11:33 AM CDT -----  Contact: self [phone]  Pt states he would like to speak with los in ref to getting a letter from the . Please call and advise. Thank you

## 2020-09-28 NOTE — LETTER
September 14, 2020    Matthew Phoenix  [address] [address]             [facility] - Primary care  [facility] [place] [facility] [address]  Phone: [phone]  Fax: [phone] To whom it may concern:    Mr. Phoenix is a patient under my care as primary care physician.  As such, he has been following with me for a diagnosis of chronic venous insufficiency.  Due to this medical problem, he is susceptible for swelling of the leg if standing for prolonged periods of time.  This would include any physical labor. Please consider making any reasonable accomodation for this in regards to his assigned duties for his community service plan.  Please contact my office for any additional information.      Respectfully,        Jordi White M.D.

## 2020-09-28 NOTE — PROGRESS NOTES
Pt came in today for 2 week bp check. Reading today is 130/88. Pt is on irbesartan -hctz 150-12.5mg 1 po qd

## 2020-09-29 NOTE — PROGRESS NOTES
Labs show blood sugar control is close to diagnosis of diabetes (A1C 6.3.).  Recommend low carb, low sugar diet and exercise to avoid progression.  Thyroid function appears low again but if not having symptoms, will hold off on synthroid at this time.  Otherwise cholesterol, kidney and liver function, blood counts look good.  HIV and hep C antibodies negative.

## 2020-10-23 ENCOUNTER — PATIENT OUTREACH (OUTPATIENT)
Dept: ADMINISTRATIVE | Facility: OTHER | Age: 50
End: 2020-10-23

## 2020-10-23 NOTE — PROGRESS NOTES
LINKS immunization registry updated  Care Everywhere updated  Health Maintenance updated  Chart reviewed for overdue Proactive [facility] ([facility]) health maintenance testing (CRS, Breast Ca, Diabetic Eye Exam)   Orders entered:N/A

## 2020-10-26 ENCOUNTER — LAB VISIT (OUTPATIENT)
Dept: LAB | Facility: HOSPITAL | Age: 50
End: 2020-10-26
Payer: COMMERCIAL

## 2020-10-26 ENCOUNTER — TELEPHONE (OUTPATIENT)
Dept: FAMILY MEDICINE | Facility: CLINIC | Age: 50
End: 2020-10-26

## 2020-10-26 ENCOUNTER — TELEPHONE (OUTPATIENT)
Dept: GASTROENTEROLOGY | Facility: CLINIC | Age: 50
End: 2020-10-26

## 2020-10-26 ENCOUNTER — OFFICE VISIT (OUTPATIENT)
Dept: GASTROENTEROLOGY | Facility: CLINIC | Age: 50
End: 2020-10-26
Payer: COMMERCIAL

## 2020-10-26 VITALS
DIASTOLIC BLOOD PRESSURE: 105 MMHG | HEIGHT: 69 IN | HEART RATE: 79 BPM | BODY MASS INDEX: 32.09 KG/M2 | SYSTOLIC BLOOD PRESSURE: 160 MMHG | WEIGHT: 216.69 LBS

## 2020-10-26 DIAGNOSIS — R13.10 DYSPHAGIA, UNSPECIFIED TYPE: Primary | ICD-10-CM

## 2020-10-26 DIAGNOSIS — R13.10 DYSPHAGIA, UNSPECIFIED TYPE: ICD-10-CM

## 2020-10-26 DIAGNOSIS — R03.0 ELEVATED BLOOD PRESSURE READING: ICD-10-CM

## 2020-10-26 LAB
BASOPHILS # BLD AUTO: 0.03 K/UL (ref 0–0.2)
BASOPHILS NFR BLD: 0.7 % (ref 0–1.9)
DIFFERENTIAL METHOD: ABNORMAL
EOSINOPHIL # BLD AUTO: 0.1 K/UL (ref 0–0.5)
EOSINOPHIL NFR BLD: 1.3 % (ref 0–8)
ERYTHROCYTE [DISTWIDTH] IN BLOOD BY AUTOMATED COUNT: 12.6 % (ref 11.5–14.5)
HCT VFR BLD AUTO: 44.3 % (ref 40–54)
HGB BLD-MCNC: 15.2 G/DL (ref 14–18)
IMM GRANULOCYTES # BLD AUTO: 0.04 K/UL (ref 0–0.04)
IMM GRANULOCYTES NFR BLD AUTO: 0.9 % (ref 0–0.5)
LYMPHOCYTES # BLD AUTO: 1.9 K/UL (ref 1–4.8)
LYMPHOCYTES NFR BLD: 41.6 % (ref 18–48)
MCH RBC QN AUTO: 30.9 PG (ref 27–31)
MCHC RBC AUTO-ENTMCNC: 34.3 G/DL (ref 32–36)
MCV RBC AUTO: 90 FL (ref 82–98)
MONOCYTES # BLD AUTO: 0.5 K/UL (ref 0.3–1)
MONOCYTES NFR BLD: 11.5 % (ref 4–15)
NEUTROPHILS # BLD AUTO: 2 K/UL (ref 1.8–7.7)
NEUTROPHILS NFR BLD: 44 % (ref 38–73)
NRBC BLD-RTO: 0 /100 WBC
PLATELET # BLD AUTO: 226 K/UL (ref 150–350)
PMV BLD AUTO: 8.5 FL (ref 9.2–12.9)
RBC # BLD AUTO: 4.92 M/UL (ref 4.6–6.2)
WBC # BLD AUTO: 4.54 K/UL (ref 3.9–12.7)

## 2020-10-26 PROCEDURE — 3077F SYST BP >= 140 MM HG: CPT | Mod: CPTII,S$GLB,, | Performed by: FAMILY MEDICINE

## 2020-10-26 PROCEDURE — 99204 OFFICE O/P NEW MOD 45 MIN: CPT | Mod: S$GLB,,, | Performed by: FAMILY MEDICINE

## 2020-10-26 PROCEDURE — 99999 PR PBB SHADOW E&M-EST. PATIENT-LVL IV: CPT | Mod: PBBFAC,,, | Performed by: FAMILY MEDICINE

## 2020-10-26 PROCEDURE — 3008F BODY MASS INDEX DOCD: CPT | Mod: CPTII,S$GLB,, | Performed by: FAMILY MEDICINE

## 2020-10-26 PROCEDURE — 3080F PR MOST RECENT DIASTOLIC BLOOD PRESSURE >= 90 MM HG: ICD-10-PCS | Mod: CPTII,S$GLB,, | Performed by: FAMILY MEDICINE

## 2020-10-26 PROCEDURE — 3008F PR BODY MASS INDEX (BMI) DOCUMENTED: ICD-10-PCS | Mod: CPTII,S$GLB,, | Performed by: FAMILY MEDICINE

## 2020-10-26 PROCEDURE — 99204 PR OFFICE/OUTPT VISIT, NEW, LEVL IV, 45-59 MIN: ICD-10-PCS | Mod: S$GLB,,, | Performed by: FAMILY MEDICINE

## 2020-10-26 PROCEDURE — 3080F DIAST BP >= 90 MM HG: CPT | Mod: CPTII,S$GLB,, | Performed by: FAMILY MEDICINE

## 2020-10-26 PROCEDURE — 36415 COLL VENOUS BLD VENIPUNCTURE: CPT

## 2020-10-26 PROCEDURE — 99999 PR PBB SHADOW E&M-EST. PATIENT-LVL IV: ICD-10-PCS | Mod: PBBFAC,,, | Performed by: FAMILY MEDICINE

## 2020-10-26 PROCEDURE — 3077F PR MOST RECENT SYSTOLIC BLOOD PRESSURE >= 140 MM HG: ICD-10-PCS | Mod: CPTII,S$GLB,, | Performed by: FAMILY MEDICINE

## 2020-10-26 PROCEDURE — 85025 COMPLETE CBC W/AUTO DIFF WBC: CPT

## 2020-10-26 NOTE — TELEPHONE ENCOUNTER
----- Message from Jordi White MD sent at 10/26/2020  4:35 PM CDT -----  Regarding: RE: Elevated BP reading  Please bring in for BP check when convenient  ----- Message -----  From: Alberta Givens MA  Sent: 10/26/2020  11:29 AM CDT  To: Jordi White MD  Subject: FW: Elevated BP reading                          160/105 at GI appointment this morning, last heck with us 130/88 on Avalide 150/12.5mg qd  ----- Message -----  From: Jacquelyn Nolan DNP  Sent: 10/26/2020  10:54 AM CDT  To: Cindy Bacon Staff  Subject: Elevated BP reading                              Fatuma,    I saw Mr Phoenix in GI clinic this AM and his BP was elevated. He states he did take his meds this morning. Wanted to let you know so he could get set up for a repeat BP check in your office.    Thanks,  Jacquelyn

## 2020-10-26 NOTE — LETTER
October 26, 2020      Jordi White MD  [address]  [place] [address]           [facility] [address] [address]  [address]  [place] [facility] [address]  Phone: [phone]  Fax: [phone]          Patient: Matthew Phoenix   MR Number: [id number]   YOB: 1970   Date of Visit: 10/26/2020       Dear Dr. Jordi White:    Thank you for referring Matthew Phoenix to me for evaluation. Attached you will find relevant portions of my assessment and plan of care.    If you have questions, please do not hesitate to call me. I look forward to following Matthew Phoenix along with you.    Sincerely,    Jacquelyn Nolan, [facility]    Enclosure  CC:  No Recipients    If you would like to receive this communication electronically, please contact [email or web address] or [phone] to request more information on Press Link access.    For providers and/or their staff who would like to refer a patient to [facility], please contact us through our one-stop-shop provider referral line, [facility], at [phone].    If you feel you have received this communication in error or would no longer like to receive these types of communications, please e-mail [email or web address]

## 2020-10-26 NOTE — PROGRESS NOTES
[facility] Consultation Note    Reason for Consult:  The primary encounter diagnosis was Dysphagia, unspecified type. A diagnosis of Elevated blood pressure reading was also pertinent to this visit.    PCP:   Jordi White   [address] [place] [address]    Referring MD:  Jordi White Md  [address] [place] [address]    HPI:  This is a 50 y.o. male here for evaluation of Dysphagia. He is a new patient.    Reports trouble swallowing, feels like foods occasionally would get stuck in his chest. Denies trouble with liquids. Has not started pantoprazole per PCP, forgot to pick this up.  Reports he has not had an issue with this since he had mentioned it to his PCP in September (messages 9/22/2020).      Reflux - no  Dysphagia - yes  Odynophagia - no  Regurgitation - no  Bowel Habits - 1-3 soft, formed easy to pass BMs per day  Rectal Bleeding/Melena- denies  NSAIDs - none  Fam Hx - No GI cancers, no IBD, no Celiac    ROS:  Constitutional: No fevers, chills, No unintentional weight loss  ENT: No allergies  CV: No chest pain  Pulm: No cough, No shortness of breath  Ophtho: No vision changes  GI: see HPI  Derm: No rash  Heme: No lymphadenopathy, No bruising  MSK: No arthritis  : No dysuria, No hematuria  Endo: No hot or cold intolerance  Neuro: No syncope, No seizure  Psych: No anxiety, No depression    Medical History:  has a past medical history of Hypertension.    Surgical History:  has a past surgical history that includes Fracture surgery (Right, 1999).    Family History: family history includes Diabetes in his sister; Eczema in his daughter; Throat cancer in his father..     Social History:  reports that he has never smoked. He does not have any smokeless tobacco history on file. He reports current alcohol use.    Review of patient's allergies indicates:   Allergen Reactions    Penicillins        Current Outpatient Medications on File Prior to Visit   Medication Sig  "Dispense Refill    irbesartan-hydrochlorothiazide (AVALIDE) 150-12.5 mg per tablet Take 1 tablet by mouth once daily. 90 tablet 1    naproxen (NAPROSYN) 500 MG tablet Take 1 tablet (500 mg total) by mouth 2 (two) times daily with meals. (Patient not taking: Reported on 10/26/2020) 30 tablet 0    pantoprazole (PROTONIX) 40 MG tablet Take 1 tablet (40 mg total) by mouth once daily. 30 tablet 1    triamcinolone acetonide 0.1% (KENALOG) 0.1 % cream Apply topically 2 (two) times daily. (Patient not taking: Reported on 10/26/2020) 80 g 6     No current facility-administered medications on file prior to visit.          Objective Findings:    Vital Signs Reviewed:  BP (!) 160/105 (Patient Position: Sitting) Comment: taken by MA  Pulse 79   Ht 5' 9" (1.753 m)   Wt 98.3 kg (216 lb 11.4 oz)   BMI 32.00 kg/m²   Body mass index is 32 kg/m².    Physical Exam:  General Appearance: Well appearing in no acute distress  Head:   Normocephalic, without obvious abnormality  Eyes:    No scleral icterus  ENT: Neck supple  Lungs: CTA bilaterally in anterior and posterior fields, no wheezes, no crackles.  Heart:  Regular rate and rhythm, S1, S2 normal, no murmurs heard  Abdomen: Rounded, Soft, non tender, non distended with positive bowel sounds in all four quadrants. No hepatosplenomegaly, ascites, or mass  Extremities: No edema  Skin: No rash  Neurologic: AAO x 3      Labs Reviewed:  Lab Results   Component Value Date    WBC 4.1 03/03/2020    HGB 14.3 03/03/2020    HCT 43.6 03/03/2020     03/03/2020    CHOL 168 09/22/2020    TRIG 114 09/22/2020    HDL 43 09/22/2020    ALT 20 09/22/2020    AST 21 09/22/2020     09/22/2020    K 4.5 09/22/2020    CL 99 09/22/2020    CREATININE 1.15 09/22/2020    BUN 12 09/22/2020    CO2 26 09/22/2020    HGBA1C 6.3 (H) 09/22/2020       No results found for: FERRITIN, TIBC, TRANSFERRIN       Imaging reviewed:  No prior abd imaging    Endoscopy reviewed:  10/28/2019 Screening colonoscopy " with Dr rice ([facility])  1. Good prep, to cecum  2. Grade I internal hemorrhoids  3. Normal examined colon  4. Repeat in 10 years    50 y.o. male here for evaluation of:    Assessment:  1. Dysphagia, unspecified type    2. Elevated blood pressure reading      Intermittent dysphagia-type symptoms, feeling like foods get stuck at mid-sternal/epigastric area. Rx'ed pantoprazole by pcp but did not start this. Low suspicion for BE; will obtain EGD to evaluate for GERD-associated inflammation. No recent CBC on file, will update to evaluate for anemias    **BP was elevated at today's visit. Pt states he did take his medications today. Repeat pressure shows improvement but still elevated. Will send message to PCP requesting nurse visit for repeat check    Recommendations:  1. EGD  2. CBC  3. Recommend Nurse BP visit with PCP office    F/U pending scope      Order summary:  Orders Placed This Encounter    CBC auto differential    Case request GI: EGD (ESOPHAGOGASTRODUODENOSCOPY)         Thank you so much for allowing me to participate in the care of Matthew Phoenix Caitlyn B Chaplain, FNP-C

## 2020-11-11 ENCOUNTER — OFFICE VISIT (OUTPATIENT)
Dept: URGENT CARE | Facility: CLINIC | Age: 50
End: 2020-11-11
Payer: MEDICAID

## 2020-11-11 ENCOUNTER — HOSPITAL ENCOUNTER (OUTPATIENT)
Facility: HOSPITAL | Age: 50
Discharge: HOME | End: 2020-11-14
Attending: EMERGENCY MEDICINE | Admitting: HOSPITALIST
Payer: COMMERCIAL

## 2020-11-11 VITALS
TEMPERATURE: 100 F | WEIGHT: 216.69 LBS | HEART RATE: 116 BPM | RESPIRATION RATE: 20 BRPM | BODY MASS INDEX: 32.09 KG/M2 | DIASTOLIC BLOOD PRESSURE: 78 MMHG | HEIGHT: 69 IN | SYSTOLIC BLOOD PRESSURE: 133 MMHG | OXYGEN SATURATION: 96 %

## 2020-11-11 DIAGNOSIS — R59.9 ENLARGED LYMPH NODE: Primary | ICD-10-CM

## 2020-11-11 DIAGNOSIS — M79.89 RIGHT LEG SWELLING: ICD-10-CM

## 2020-11-11 DIAGNOSIS — R79.89 ELEVATED LFTS: ICD-10-CM

## 2020-11-11 DIAGNOSIS — I10 ESSENTIAL HYPERTENSION: ICD-10-CM

## 2020-11-11 DIAGNOSIS — M79.89 PAIN AND SWELLING OF LOWER LEG, RIGHT: ICD-10-CM

## 2020-11-11 DIAGNOSIS — M79.604 ACUTE PAIN OF RIGHT LOWER EXTREMITY: ICD-10-CM

## 2020-11-11 DIAGNOSIS — R19.09 LUMP IN THE GROIN: Primary | ICD-10-CM

## 2020-11-11 DIAGNOSIS — A41.9 SEPSIS, UNSPECIFIED ORGANISM: ICD-10-CM

## 2020-11-11 DIAGNOSIS — M79.604 RIGHT LEG PAIN: ICD-10-CM

## 2020-11-11 DIAGNOSIS — R07.9 CHEST PAIN: ICD-10-CM

## 2020-11-11 DIAGNOSIS — M79.661 PAIN AND SWELLING OF LOWER LEG, RIGHT: ICD-10-CM

## 2020-11-11 LAB
ALBUMIN SERPL BCP-MCNC: 4.4 G/DL (ref 3.5–5.2)
ALP SERPL-CCNC: 77 U/L (ref 55–135)
ALT SERPL W/O P-5'-P-CCNC: 37 U/L (ref 10–44)
ANION GAP SERPL CALC-SCNC: 12 MMOL/L (ref 8–16)
AST SERPL-CCNC: 28 U/L (ref 10–40)
BASOPHILS # BLD AUTO: 0.03 K/UL (ref 0–0.2)
BASOPHILS NFR BLD: 0.2 % (ref 0–1.9)
BILIRUB SERPL-MCNC: 1.4 MG/DL (ref 0.1–1)
BILIRUB UR QL STRIP: NEGATIVE
BUN SERPL-MCNC: 15 MG/DL (ref 6–20)
CALCIUM SERPL-MCNC: 9.7 MG/DL (ref 8.7–10.5)
CHLORIDE SERPL-SCNC: 102 MMOL/L (ref 95–110)
CLARITY UR REFRACT.AUTO: ABNORMAL
CO2 SERPL-SCNC: 22 MMOL/L (ref 23–29)
COLOR UR AUTO: ABNORMAL
CREAT SERPL-MCNC: 1.3 MG/DL (ref 0.5–1.4)
CRP SERPL-MCNC: 61.7 MG/L (ref 0–8.2)
CTP QC/QA: YES
DIFFERENTIAL METHOD: ABNORMAL
EOSINOPHIL # BLD AUTO: 0 K/UL (ref 0–0.5)
EOSINOPHIL NFR BLD: 0 % (ref 0–8)
ERYTHROCYTE [DISTWIDTH] IN BLOOD BY AUTOMATED COUNT: 12.7 % (ref 11.5–14.5)
ERYTHROCYTE [SEDIMENTATION RATE] IN BLOOD BY WESTERGREN METHOD: 21 MM/HR (ref 0–23)
EST. GFR  (AFRICAN AMERICAN): >60 ML/MIN/1.73 M^2
EST. GFR  (NON AFRICAN AMERICAN): >60 ML/MIN/1.73 M^2
GLUCOSE SERPL-MCNC: 125 MG/DL (ref 70–110)
GLUCOSE UR QL STRIP: NEGATIVE
HCT VFR BLD AUTO: 45 % (ref 40–54)
HGB BLD-MCNC: 15.2 G/DL (ref 14–18)
HGB UR QL STRIP: NEGATIVE
IMM GRANULOCYTES # BLD AUTO: 0.07 K/UL (ref 0–0.04)
IMM GRANULOCYTES NFR BLD AUTO: 0.5 % (ref 0–0.5)
KETONES UR QL STRIP: NEGATIVE
LACTATE SERPL-SCNC: 1.6 MMOL/L (ref 0.5–2.2)
LEUKOCYTE ESTERASE UR QL STRIP: NEGATIVE
LYMPHOCYTES # BLD AUTO: 1.3 K/UL (ref 1–4.8)
LYMPHOCYTES NFR BLD: 9.1 % (ref 18–48)
MAGNESIUM SERPL-MCNC: 2 MG/DL (ref 1.6–2.6)
MCH RBC QN AUTO: 29.6 PG (ref 27–31)
MCHC RBC AUTO-ENTMCNC: 33.8 G/DL (ref 32–36)
MCV RBC AUTO: 88 FL (ref 82–98)
MONOCYTES # BLD AUTO: 1.4 K/UL (ref 0.3–1)
MONOCYTES NFR BLD: 10 % (ref 4–15)
NEUTROPHILS # BLD AUTO: 11 K/UL (ref 1.8–7.7)
NEUTROPHILS NFR BLD: 80.2 % (ref 38–73)
NITRITE UR QL STRIP: NEGATIVE
NRBC BLD-RTO: 0 /100 WBC
PH UR STRIP: 5 [PH] (ref 5–8)
PLATELET # BLD AUTO: 223 K/UL (ref 150–350)
PMV BLD AUTO: 8.7 FL (ref 9.2–12.9)
POTASSIUM SERPL-SCNC: 4.2 MMOL/L (ref 3.5–5.1)
PROCALCITONIN SERPL IA-MCNC: 0.19 NG/ML
PROT SERPL-MCNC: 8.5 G/DL (ref 6–8.4)
PROT UR QL STRIP: NEGATIVE
RBC # BLD AUTO: 5.14 M/UL (ref 4.6–6.2)
SARS-COV-2 RDRP RESP QL NAA+PROBE: NEGATIVE
SODIUM SERPL-SCNC: 136 MMOL/L (ref 136–145)
SP GR UR STRIP: 1.03 (ref 1–1.03)
URN SPEC COLLECT METH UR: ABNORMAL
WBC # BLD AUTO: 13.72 K/UL (ref 3.9–12.7)

## 2020-11-11 PROCEDURE — 63600175 PHARM REV CODE 636 W HCPCS: Performed by: EMERGENCY MEDICINE

## 2020-11-11 PROCEDURE — 25500020 PHARM REV CODE 255: Performed by: EMERGENCY MEDICINE

## 2020-11-11 PROCEDURE — 96375 TX/PRO/DX INJ NEW DRUG ADDON: CPT | Mod: 59

## 2020-11-11 PROCEDURE — 99214 OFFICE O/P EST MOD 30 MIN: CPT | Mod: S$GLB,,, | Performed by: FAMILY MEDICINE

## 2020-11-11 PROCEDURE — 83605 ASSAY OF LACTIC ACID: CPT

## 2020-11-11 PROCEDURE — 25000003 PHARM REV CODE 250: Performed by: EMERGENCY MEDICINE

## 2020-11-11 PROCEDURE — 99285 EMERGENCY DEPT VISIT HI MDM: CPT | Mod: 25

## 2020-11-11 PROCEDURE — 81003 URINALYSIS AUTO W/O SCOPE: CPT

## 2020-11-11 PROCEDURE — U0002 COVID-19 LAB TEST NON-CDC: HCPCS | Performed by: EMERGENCY MEDICINE

## 2020-11-11 PROCEDURE — G0378 HOSPITAL OBSERVATION PER HR: HCPCS

## 2020-11-11 PROCEDURE — 96361 HYDRATE IV INFUSION ADD-ON: CPT | Mod: 59

## 2020-11-11 PROCEDURE — 85025 COMPLETE CBC W/AUTO DIFF WBC: CPT

## 2020-11-11 PROCEDURE — 25000003 PHARM REV CODE 250: Performed by: PHYSICIAN ASSISTANT

## 2020-11-11 PROCEDURE — 99285 EMERGENCY DEPT VISIT HI MDM: CPT | Mod: ,,, | Performed by: EMERGENCY MEDICINE

## 2020-11-11 PROCEDURE — 99214 PR OFFICE/OUTPT VISIT, EST, LEVL IV, 30-39 MIN: ICD-10-PCS | Mod: S$GLB,,, | Performed by: FAMILY MEDICINE

## 2020-11-11 PROCEDURE — 86140 C-REACTIVE PROTEIN: CPT

## 2020-11-11 PROCEDURE — 99285 PR EMERGENCY DEPT VISIT,LEVEL V: ICD-10-PCS | Mod: ,,, | Performed by: EMERGENCY MEDICINE

## 2020-11-11 PROCEDURE — A9585 GADOBUTROL INJECTION: HCPCS | Performed by: EMERGENCY MEDICINE

## 2020-11-11 PROCEDURE — 96365 THER/PROPH/DIAG IV INF INIT: CPT | Mod: 59 | Performed by: EMERGENCY MEDICINE

## 2020-11-11 PROCEDURE — 96360 HYDRATION IV INFUSION INIT: CPT

## 2020-11-11 PROCEDURE — 83735 ASSAY OF MAGNESIUM: CPT

## 2020-11-11 PROCEDURE — 84145 PROCALCITONIN (PCT): CPT

## 2020-11-11 PROCEDURE — 80053 COMPREHEN METABOLIC PANEL: CPT

## 2020-11-11 PROCEDURE — 85652 RBC SED RATE AUTOMATED: CPT

## 2020-11-11 RX ORDER — ACETAMINOPHEN 500 MG
1000 TABLET ORAL
Status: DISCONTINUED | OUTPATIENT
Start: 2020-11-11 | End: 2020-11-11

## 2020-11-11 RX ORDER — ACETAMINOPHEN 325 MG/1
650 TABLET ORAL EVERY 6 HOURS PRN
Status: DISCONTINUED | OUTPATIENT
Start: 2020-11-11 | End: 2020-11-14 | Stop reason: HOSPADM

## 2020-11-11 RX ORDER — IRBESARTAN 150 MG/1
150 TABLET ORAL DAILY
Status: DISCONTINUED | OUTPATIENT
Start: 2020-11-12 | End: 2020-11-14 | Stop reason: HOSPADM

## 2020-11-11 RX ORDER — SUCRALFATE 1 G/10ML
1 SUSPENSION ORAL EVERY 6 HOURS
Status: DISCONTINUED | OUTPATIENT
Start: 2020-11-12 | End: 2020-11-12

## 2020-11-11 RX ORDER — ENOXAPARIN SODIUM 100 MG/ML
40 INJECTION SUBCUTANEOUS EVERY 24 HOURS
Status: DISCONTINUED | OUTPATIENT
Start: 2020-11-12 | End: 2020-11-14 | Stop reason: HOSPADM

## 2020-11-11 RX ORDER — SODIUM CHLORIDE 9 MG/ML
INJECTION, SOLUTION INTRAVENOUS CONTINUOUS
Status: DISCONTINUED | OUTPATIENT
Start: 2020-11-12 | End: 2020-11-13

## 2020-11-11 RX ORDER — MAG HYDROX/ALUMINUM HYD/SIMETH 200-200-20
30 SUSPENSION, ORAL (FINAL DOSE FORM) ORAL
Status: DISCONTINUED | OUTPATIENT
Start: 2020-11-12 | End: 2020-11-12

## 2020-11-11 RX ORDER — ACETAMINOPHEN 500 MG
1000 TABLET ORAL
Status: COMPLETED | OUTPATIENT
Start: 2020-11-11 | End: 2020-11-11

## 2020-11-11 RX ORDER — PANTOPRAZOLE SODIUM 40 MG/1
40 TABLET, DELAYED RELEASE ORAL DAILY
Status: DISCONTINUED | OUTPATIENT
Start: 2020-11-12 | End: 2020-11-13

## 2020-11-11 RX ORDER — ONDANSETRON 2 MG/ML
4 INJECTION INTRAMUSCULAR; INTRAVENOUS EVERY 8 HOURS PRN
Status: DISCONTINUED | OUTPATIENT
Start: 2020-11-12 | End: 2020-11-14 | Stop reason: HOSPADM

## 2020-11-11 RX ORDER — CLINDAMYCIN PHOSPHATE 600 MG/50ML
600 INJECTION, SOLUTION INTRAVENOUS
Status: COMPLETED | OUTPATIENT
Start: 2020-11-12 | End: 2020-11-14

## 2020-11-11 RX ORDER — GADOBUTROL 604.72 MG/ML
10 INJECTION INTRAVENOUS
Status: COMPLETED | OUTPATIENT
Start: 2020-11-11 | End: 2020-11-11

## 2020-11-11 RX ORDER — MORPHINE SULFATE 4 MG/ML
4 INJECTION, SOLUTION INTRAMUSCULAR; INTRAVENOUS
Status: COMPLETED | OUTPATIENT
Start: 2020-11-11 | End: 2020-11-11

## 2020-11-11 RX ORDER — SODIUM CHLORIDE 0.9 % (FLUSH) 0.9 %
10 SYRINGE (ML) INJECTION
Status: DISCONTINUED | OUTPATIENT
Start: 2020-11-12 | End: 2020-11-14 | Stop reason: HOSPADM

## 2020-11-11 RX ORDER — HYDROCODONE BITARTRATE AND ACETAMINOPHEN 5; 325 MG/1; MG/1
1 TABLET ORAL EVERY 6 HOURS PRN
Status: DISCONTINUED | OUTPATIENT
Start: 2020-11-12 | End: 2020-11-14 | Stop reason: HOSPADM

## 2020-11-11 RX ORDER — CLINDAMYCIN PHOSPHATE 600 MG/50ML
600 INJECTION, SOLUTION INTRAVENOUS
Status: COMPLETED | OUTPATIENT
Start: 2020-11-11 | End: 2020-11-11

## 2020-11-11 RX ADMIN — GADOBUTROL 10 ML: 604.72 INJECTION INTRAVENOUS at 07:11

## 2020-11-11 RX ADMIN — SODIUM CHLORIDE 1000 ML: 0.9 INJECTION, SOLUTION INTRAVENOUS at 12:11

## 2020-11-11 RX ADMIN — ACETAMINOPHEN 1000 MG: 500 TABLET ORAL at 12:11

## 2020-11-11 RX ADMIN — MORPHINE SULFATE 4 MG: 4 INJECTION INTRAVENOUS at 04:11

## 2020-11-11 RX ADMIN — CLINDAMYCIN IN 5 PERCENT DEXTROSE 600 MG: 12 INJECTION, SOLUTION INTRAVENOUS at 09:11

## 2020-11-11 RX ADMIN — ACETAMINOPHEN 650 MG: 325 TABLET ORAL at 11:11

## 2020-11-11 NOTE — ED TRIAGE NOTES
states sent to ER for US right leg/ pain +swelling. Denies sob. Pt reports pain 101/10. Denies chest pain. Pt has fever for the past day. Pt is aox4.

## 2020-11-11 NOTE — PROVIDER PROGRESS NOTES - EMERGENCY DEPT.
Encounter Date: 11/11/2020    ED Physician Progress Notes           ED Attending Sign-out Progress Note:  Patient signed out to me at shift change by Dr. Holloway to f/u ortho dispo and overall disposition. Expectation is he will likely be discharged home.     Discussed with Orthopedic surgery recommended MRI which they ordered.  I do not believe there is indication for arthrocentesis of knee or hip.  They do not believe there to be an infected joint this time.  However they were concerned with his clinical exam and history.  They recommend admission to Hospital Medicine for IV antibiotics.  Patient reluctant to stay initially.  No COVID swab was performed initially, decision to admit will depend on COVID swab and MRI result.    MRI of the distal returned with left thigh mass and adenopathy.  Ortho concerned the patient is high risk for clinical deterioration of discharge home.  They again recommend admission to Hospital Medicine after MRI result. COVID-19 negative.     Given dose of clindamycin in the ED. Patient amenable to admission for further IV antibiotics and monitoring.    Discussed with hospital medicine who placed in observation.    ED Clinical Impression:    ICD-10-CM ICD-9-CM   1. Sepsis, unspecified organism  A41.9 038.9     995.91   2. Right leg pain  M79.604 729.5   3. Right leg swelling  M79.89 729.81   4. Pain and swelling of lower leg, right  M79.661 729.5    M79.89 729.81   5. Chest pain  R07.9 786.50

## 2020-11-11 NOTE — ED PROVIDER NOTES
Encounter Date: 11/11/2020    SCRIBE #1 NOTE: I, Sulma Jama, am scribing for, and in the presence of,  Dr. Holloway. I have scribed the entire note.       History     Chief Complaint   Patient presents with    Leg Pain     states sent to ER for US right leg/ pain +swelling     The patient is a 50 year old male with a PMHx of HTN and right lower leg compound fracture (required surgical repair), who presents to the ED with a chief complaint of right leg swelling. He endorses worsening swelling of his entire right leg over the past few days, accompanied by right groin pain, right medial knee pain, and fever that started last night. Patient reports that the lower leg has been swollen at baseline due to a broken leg in 1999, but this swelling is new. Swelling present in the back of his calf. Able to bend knee and hip with pain. Pain worst from groin down to knee. Denies recent injuries, hx of blood clot, testicular pain, hx IVDA, hx smoking, alcohol abuse, nausea, vomiting, diarrhea, shortness of breath, cough.     The history is provided by the patient and medical records.     Review of patient's allergies indicates:   Allergen Reactions    Penicillins      Past Medical History:   Diagnosis Date    Hypertension      Past Surgical History:   Procedure Laterality Date    FRACTURE SURGERY Right 1999    [facility] ([facility] Right lower leg compound fracture repair     Family History   Problem Relation Age of Onset    Eczema Daughter     Throat cancer Father     Diabetes Sister      Social History     Tobacco Use    Smoking status: Never Smoker   Substance Use Topics    Alcohol use: Yes    Drug use: Not on file     Review of Systems   Constitutional: Positive for fever. Negative for chills.   HENT: Negative for congestion and rhinorrhea.    Eyes: Negative for photophobia and visual disturbance.   Respiratory: Negative for cough and shortness of breath.    Cardiovascular: Positive for leg swelling (right).  Negative for chest pain.   Gastrointestinal: Negative for diarrhea, nausea and vomiting.   Genitourinary: Negative for testicular pain.        +right groin pain   Musculoskeletal: Positive for myalgias (right leg and groin). Negative for joint swelling.   Skin: Negative for color change and rash.   Neurological: Negative for numbness and headaches.       Physical Exam     Initial Vitals [11/11/20 1159]   BP Pulse Resp Temp SpO2   122/84 110 16 (!) 101.9 °F (38.8 °C) 98 %      MAP       --         Physical Exam    Nursing note and vitals reviewed.    Gen/Constitutional: Interactive.  Acute emotional distress secondary to pain, he is running a fever.  Head: Normocephalic, Atraumatic  Neck: supple, no masses or LAD  Eyes: PERRLA, conjunctiva clear  Ears, Nose and Throat: No rhinorrhea or stridor.  Cardiac:  Tachycardic rate, Reg Rhythm, No murmur  Pulmonary: CTA Bilat, no wheezes, rhonchi, rales.  GI: Abdomen soft, non-tender, non-distended; no rebound or guarding  : No CVA tenderness.  Musculoskeletal: Extremities warm, well perfused, no erythema, no edema  Right leg:  Tenderness to palpation throughout entire leg, pain worse with extension, flexion or rotation.  He has a large lateral leg incision from prior hardware that is well healed with no signs of drainage or infection.  He is acutely tender worse in the right groin extending to the right mid thigh with a palpable mass.  There is no axial load pain at the hip, knee or ankle.  No pain with abduction or adduction of the hip.  He is neurovascular intact, compartments are soft and compressible.  2+ distal pulses intact, sensory intact to light touch.  Skin: No rashes  Neuro: Alert and Oriented x 3; No focal motor or sensory deficits.    Psych: Normal affect        ED Course   Procedures  Labs Reviewed   CBC W/ AUTO DIFFERENTIAL - Abnormal; Notable for the following components:       Result Value    WBC 13.72 (*)     MPV 8.7 (*)     Gran # (ANC) 11.0 (*)      Immature Grans (Abs) 0.07 (*)     Mono # 1.4 (*)     Gran % 80.2 (*)     Lymph % 9.1 (*)     All other components within normal limits   COMPREHENSIVE METABOLIC PANEL - Abnormal; Notable for the following components:    CO2 22 (*)     Glucose 125 (*)     Total Protein 8.5 (*)     Total Bilirubin 1.4 (*)     All other components within normal limits   URINALYSIS, REFLEX TO URINE CULTURE - Abnormal; Notable for the following components:    Appearance, UA Hazy (*)     All other components within normal limits    Narrative:     Specimen Source->Urine   C-REACTIVE PROTEIN - Abnormal; Notable for the following components:    CRP 61.7 (*)     All other components within normal limits   CULTURE, BLOOD   CULTURE, BLOOD   LACTIC ACID, PLASMA   MAGNESIUM   SEDIMENTATION RATE   PROCALCITONIN   SARS-COV-2 RDRP GENE          Imaging Results          X-Ray Tibia Fibula 2 View Right (Final result)  Result time 11/11/20 19:58:32    Final result by Joel Bradshaw MD (11/11/20 19:58:32)                 Impression:      Postoperative changes of the right distal tibia and fibula as above.  Nonspecific periostitis with no definitive hardware complication, allowing for absence of comparison radiographs.    No evidence of fracture or dislocation of the right ankle.    Soft tissue swelling throughout the right lower extremity.      Electronically signed by: Joel Bradshaw MD  Date:    11/11/2020  Time:    19:58             Narrative:    EXAMINATION:  XR ANKLE COMPLETE 3 VIEW RIGHT; XR TIBIA FIBULA 2 VIEW RIGHT    CLINICAL HISTORY:  pain;    TECHNIQUE:  AP, lateral, and oblique images of the right ankle were performed.    Frontal and lateral radiographs of the right tibia and fibula were also obtained    COMPARISON:  None    FINDINGS:  There are postop changes of arthrodesis of screw and plate fixation of the right distal tibia and fibula.  No periprosthetic lucencies identified, allowing for absence of comparison examinations.  No  periprosthetic fracture is identified.  There is periostitis involving the distal aspect of the fibula and tibia.    The ankle mortise is intact.  There is no cortical step-off.  The joint spaces are maintained.  There is overall soft tissue swelling of the right lower extremity.  No suspicious radiopaque foreign body is identified.    There is no evidence of acute fracture or dislocation.                               X-Ray Ankle Complete Right (Final result)  Result time 11/11/20 19:58:32    Final result by Joel Bradshaw MD (11/11/20 19:58:32)                 Impression:      Postoperative changes of the right distal tibia and fibula as above.  Nonspecific periostitis with no definitive hardware complication, allowing for absence of comparison radiographs.    No evidence of fracture or dislocation of the right ankle.    Soft tissue swelling throughout the right lower extremity.      Electronically signed by: Joel Bradshaw MD  Date:    11/11/2020  Time:    19:58             Narrative:    EXAMINATION:  XR ANKLE COMPLETE 3 VIEW RIGHT; XR TIBIA FIBULA 2 VIEW RIGHT    CLINICAL HISTORY:  pain;    TECHNIQUE:  AP, lateral, and oblique images of the right ankle were performed.    Frontal and lateral radiographs of the right tibia and fibula were also obtained    COMPARISON:  None    FINDINGS:  There are postop changes of arthrodesis of screw and plate fixation of the right distal tibia and fibula.  No periprosthetic lucencies identified, allowing for absence of comparison examinations.  No periprosthetic fracture is identified.  There is periostitis involving the distal aspect of the fibula and tibia.    The ankle mortise is intact.  There is no cortical step-off.  The joint spaces are maintained.  There is overall soft tissue swelling of the right lower extremity.  No suspicious radiopaque foreign body is identified.    There is no evidence of acute fracture or dislocation.                               X-Ray Pelvis Routine  AP (Final result)  Result time 11/11/20 19:55:20    Final result by Jose Osorio MD (11/11/20 19:55:20)                 Impression:      1. No acute displaced fracture or dislocation of the pelvis.      Electronically signed by: Jose Osorio MD  Date:    11/11/2020  Time:    19:55             Narrative:    EXAMINATION:  XR PELVIS ROUTINE AP    CLINICAL HISTORY:  pain;    TECHNIQUE:  AP view of the pelvis was performed.    COMPARISON:  None.    FINDINGS:  Single-view pelvis.    The bilateral sacroiliac joints are intact.  The pubic symphysis is intact.  The bilateral femoroacetabular joints are intact.                               X-Ray Knee 1 or 2 View Right (Final result)  Result time 11/11/20 19:54:39   Procedure changed from X-Ray Knee 3 View Right     Final result by Jose Osorio MD (11/11/20 19:54:39)                 Impression:      1. No acute displaced fracture or dislocation of the knee, additional findings above.      Electronically signed by: Jose Osorio MD  Date:    11/11/2020  Time:    19:54             Narrative:    EXAMINATION:  XR KNEE 1 OR 2 VIEW RIGHT    CLINICAL HISTORY:  pain;    TECHNIQUE:  AP and lateral views of the right knee were performed.    COMPARISON:  None    FINDINGS:  Two views right knee.    No acute displaced fracture or dislocation of the knee.  No radiopaque foreign body.  Surgical changes are noted of the proximal tibia.  There may be some degree of osteopenia, correlation with any history of previous injury recommended.                                MRI Pelvis W WO Contrast (Final result)  Result time 11/11/20 19:45:54   Procedure changed from MRI Pelvis With Contrast     Final result by Joel Bradshaw MD (11/11/20 19:45:54)                 Impression:      Enhancing 5.6 x 1.6 cm T2 hyperintense/T1 isointense mass within the subcutaneous tissue of the right medial thigh and additional smaller lesions in the bilateral inguinal regions.  Stranding adjacent to  the dominant lesion in the right inguinal region.  The findings may represent superinfected right-sided inguinal lymph node.  No definitive abscess.    This report was flagged in Epic as abnormal.    Electronically signed by resident: Janene Cedillo  Date:    11/11/2020  Time:    19:07    Electronically signed by: Joel Bradshaw MD  Date:    11/11/2020  Time:    19:45             Narrative:    EXAMINATION:  MRI PELVIS W WO CONTRAST    CLINICAL HISTORY:  MRI pelvis with and without contrast;    TECHNIQUE:  Multiplanar, multisequence imaging of the pelvis and bilat hip were obtained prior to and without the use of contrast.  10 cc of intravenous Gadavist was administered.    COMPARISON:  Radiograph two view femur right 11/11/2020, ultrasound lower extremity veins bilateral 11/11/2020    FINDINGS:  Osseous Structures: No fracture, avascular necrosis or other abnormality.No marrow signal abnormality to suggest bone marrow replacement process.    Hip and Sacroiliac joints: Normal, without evidence of joint effusion or other abnormalities.    Bursae: No trochanteric or iliopsoas bursitis.    Soft Tissues: Muscles and tendons of the pelvis and both hips show no atrophy or mass.  There is an approximately 5.1 x 1.6 cm T1 isointense, high T2 hyperintense enhancing mass within the right medial subcutaneous tissue of the thigh with mild adjacent fat stranding.  The lesion demonstrates a runner form structure with additional smaller lesions in the right medial thigh region.  There are similar contralateral lesions of smaller size in the left medial thigh.                               US Lower Extremity Veins Bilateral (Final result)  Result time 11/11/20 14:41:32    Final result by Jose Osorio MD (11/11/20 14:41:32)                 Impression:      No evidence of deep venous thrombosis in either lower extremity.    Right inguinal lymphadenopathy, nonspecific, possibly reactive, correlation is advised.      Electronically  signed by: Jose Osorio MD  Date:    11/11/2020  Time:    14:41             Narrative:    EXAMINATION:  US LOWER EXTREMITY VEINS BILATERAL    CLINICAL HISTORY:  Other specified soft tissue disorders    TECHNIQUE:  Duplex and color flow Doppler and dynamic compression was performed of the bilateral lower extremity veins was performed.    COMPARISON:  None    FINDINGS:  Duplex and color flow Doppler evaluation does not reveal any evidence of acute venous thrombosis in the common femoral, superficial femoral, greater saphenous, popliteal, peroneal, anterior tibial and posterior tibial veins bilaterally.  There is no reflux to suggest valvular incompetence.  Enlarged right inguinal lymph node noted with otherwise normal appearing morphology.  There is slow flow through the right popliteal vein.                               X-Ray Femur 2 AP/LAT Right (Final result)  Result time 11/11/20 13:07:00    Final result by Jack Toledo III, MD (11/11/20 13:07:00)                 Narrative:    EXAMINATION:  XR FEMUR 2 VIEW RIGHT    CLINICAL HISTORY:  Pain in right leg    FINDINGS:  Two views: No fracture dislocation bone destruction seen.      Electronically signed by: Jack Toledo MD  Date:    11/11/2020  Time:    13:07                            X-Rays:   Independently Interpreted Readings:   Other Readings:  X-ray tib-fib:  Soft tissue swelling throughout the right lower extremity but no fracture or dislocation acutely.    X-ray femur and hip:  No acute fracture or dislocation    Medical Decision Making:   History:   Old Medical Records: I decided to obtain old medical records.  Old Records Summarized: records from clinic visits.  Initial Assessment:   The patient is a 50 year old male with a PMHx of HTN and right lower leg compound fracture (required surgical repair), who presents to the ED with a chief complaint of right leg swelling.  Differential Diagnosis:   Differential diagnosis includes but is not limited to:  DVT, muscle injury, septic arthritis, venous insufficiency, Baker's cyst      Independently Interpreted Test(s):   I have ordered and independently interpreted X-rays - see prior notes.  Clinical Tests:   Lab Tests: Reviewed and Ordered  Radiological Study: Ordered and Reviewed  Other:   I have discussed this case with another health care provider.       <> Summary of the Discussion: Orthopedic surgery    Emergent evaluation of a patient presenting with worsening right lower extremity swelling and pain throughout.  No obvious bony deformity, open fracture or open laceration.  He does have significant swelling extending from the foot to the hip on the right side with a palpable mass or lymph node in the right groin/femoral region.  He is running a fever of 102, tachycardic without hypotension.  Unclear etiology of his fever but concerned about a deep seated or deep space infection including septic hip, osteomyelitis or psoas abscess.  A broad workup was conducted including IV line, labs, inflammatory markers, x-rays of the right lower extremity, pelvis, hip and DVT study.  Initial x-rays negative for acute fracture or dislocation but do show uniform swelling of the right lower extremity.  Inflammatory markers elevated, and there are his leukocytosis as well on blood work.  No significant acidosis.  Ultrasound DVT study negative for acute finding.  I am still concerned about a deep-seated infection or septic joint, discussed case with Orthopedic surgery.  He has limited range of motion of his lower extremity secondary to pain but no obvious limiting range of motion to passive flexion and extension including external rotation.  The patient was signed out to the oncoming ED physician, Dr. Sandra with final disposition pending Orthopedic evaluation for septic arthritis versus osteomyelitis versus deep seated infection.  At this time continued evaluation for admission or discharge is pending.    Complexity:  High  risk-level 5          Scribe Attestation:   Scribe #1: I performed the above scribed service and the documentation accurately describes the services I performed. I attest to the accuracy of the note.    I, Dr. Jame Holloway, personally performed the services described in this documentation. All medical record entries made by the scribe were at my direction and in my presence.  I have reviewed the chart and agree that the record reflects my personal performance and is accurate and complete.                     Clinical Impression:     ICD-10-CM ICD-9-CM   1. Sepsis, unspecified organism  A41.9 038.9     995.91   2. Right leg pain  M79.604 729.5   3. Right leg swelling  M79.89 729.81   4. Pain and swelling of lower leg, right  M79.661 729.5    M79.89 729.81   5. Chest pain  R07.9 786.50   6. Enlarged lymph node  R59.9 785.6                      Disposition:   Disposition: Placed in Observation  Condition: Fair     ED Disposition Condition    Observation            Jame Holloway DO, [facility]  Emergency Staff Physician   Dept of Emergency Medicine   [facility]  Spectralink: [id number]                   Jame Holloway DO  11/13/20 1002

## 2020-11-11 NOTE — PROGRESS NOTES
"Subjective:       Patient ID: Matthew Phoenix is a 50 y.o. male.    Vitals:  height is 5' 9" (1.753 m) and weight is 98.3 kg (216 lb 11.4 oz). His temperature is 100.3 °F (37.9 °C). His blood pressure is 133/78 and his pulse is 116 (abnormal). His respiration is 20 and oxygen saturation is 96%.     Chief Complaint: Leg Pain (Right)    50 years old male presents with right groin pain and lump for 2 days and fever for 1 day.  Patient states the pain is radiating to whole leg.  Pain intensity is moderate to severe increased with leg movements.  Denies dysuria, penile discharge, rash.    Leg Pain   The incident occurred more than 1 week ago. The incident occurred at home. There was no injury mechanism. The pain is present in the right leg. The quality of the pain is described as aching. The pain is at a severity of 5/10. The pain is moderate. The pain has been constant since onset. Pertinent negatives include no inability to bear weight, loss of motion, loss of sensation, muscle weakness, numbness or tingling. He reports no foreign bodies present. Nothing aggravates the symptoms. He has tried nothing for the symptoms. The treatment provided no relief.       Constitution: Negative for chills, fatigue and fever.   HENT: Negative for congestion and sore throat.    Neck: Negative for painful lymph nodes.   Cardiovascular: Negative for chest pain and leg swelling.   Eyes: Negative for double vision and blurred vision.   Respiratory: Negative for cough and shortness of breath.    Gastrointestinal: Negative for nausea, vomiting and diarrhea.   Genitourinary: Negative for dysuria, frequency and urgency.   Musculoskeletal: Negative for joint pain, joint swelling, muscle cramps and muscle ache.   Skin: Negative for color change, pale and rash.   Allergic/Immunologic: Negative for seasonal allergies.   Neurological: Negative for dizziness, history of vertigo, light-headedness, passing out, headaches and numbness. "   Hematologic/Lymphatic: Negative for swollen lymph nodes, easy bruising/bleeding and history of blood clots. Does not bruise/bleed easily.   Psychiatric/Behavioral: Negative for nervous/anxious, sleep disturbance and depression. The patient is not nervous/anxious.        Objective:      Physical Exam   Constitutional: He is oriented to person, place, and time. He appears well-developed. He is cooperative.  Non-toxic appearance. He does not appear ill. No distress.   HENT:   Head: Normocephalic and atraumatic.   Ears:   Right Ear: Hearing, tympanic membrane, external ear and ear canal normal.   Left Ear: Hearing, tympanic membrane, external ear and ear canal normal.   Nose: Nose normal. No mucosal edema, rhinorrhea or nasal deformity. No epistaxis. Right sinus exhibits no maxillary sinus tenderness and no frontal sinus tenderness. Left sinus exhibits no maxillary sinus tenderness and no frontal sinus tenderness.   Mouth/Throat: Uvula is midline, oropharynx is clear and moist and mucous membranes are normal. No trismus in the jaw. Normal dentition. No uvula swelling. No posterior oropharyngeal erythema.   Eyes: Conjunctivae and lids are normal. Right eye exhibits no discharge. Left eye exhibits no discharge. No scleral icterus.   Neck: Trachea normal, normal range of motion, full passive range of motion without pain and phonation normal. Neck supple.   Cardiovascular: Normal rate, regular rhythm, normal heart sounds and normal pulses.   No murmur heard.  Pulmonary/Chest: Effort normal and breath sounds normal. No stridor. No respiratory distress. He has no wheezes. He has no rhonchi. He has no rales.   Abdominal: Soft. Normal appearance and bowel sounds are normal. He exhibits no distension, no pulsatile midline mass and no mass. There is no abdominal tenderness. There is right CVA tenderness. There is no rebound, no guarding and no left CVA tenderness.   Musculoskeletal: Normal range of motion.         General:  Swelling and tenderness present. No deformity.      Right lower leg: No edema.      Left lower leg: No edema.      Comments:  right groin/upper thigh medial aspect: +ve swelling and TTP. +ve mild warmth. No induration or fluctuation. No  LN enlargement.   FROM. Neurovascular intact.   Neurological: He is alert and oriented to person, place, and time. He exhibits normal muscle tone. Coordination normal.   Skin: Skin is warm, dry, intact, not diaphoretic and not pale. Psychiatric: His speech is normal and behavior is normal. Judgment and thought content normal.   Nursing note and vitals reviewed.        Assessment:       1. Lump in the groin    2. Acute pain of right lower extremity        Plan:         Lump in the groin  -     Refer to Emergency Dept.    Acute pain of right lower extremity  -     Refer to Emergency Dept.       Called and confirmed transfer to ED.

## 2020-11-11 NOTE — ED NOTES
Pt AAOx3, resting in the recliner. No distress noted. RR even and unlabored. Reports pain 6/10 but comfortable at present time. No other complaints voiced. Updated on POC- waiting on ultrasound results, verbalizes understanding. Will continue to monitor.

## 2020-11-11 NOTE — ED NOTES
Pt resting in recliner. Aaox3. No acute distress noted. RR even and unlabored. Updated on POC. Will continue to monitor.

## 2020-11-11 NOTE — ED NOTES
Patient identifiers verified and correct for Matthew Phoenix   LOC: The patient is awake, alert and aware of environment with an appropriate affect, the patient is oriented x 3 and speaking appropriately.   APPEARANCE: Patient appears comfortable and in no acute distress, patient is clean and well groomed.  SKIN: The skin is warm and dry, color consistent with ethnicity, patient has normal skin turgor and moist mucus membranes, skin intact, no breakdown or bruising noted.   MUSCULOSKELETAL:states sent to ER for US right leg/ pain +swelling. Pt hx of compound fracture in 98. Pt denies sob.   RESPIRATORY: Airway is open and patent, respirations are spontaneous, patient has a normal effort and rate, no accessory muscle use noted  CARDIAC:  Patient has a normal rate and regular rhythm, no edema noted, capillary refill < 3 seconds.   GASTRO: Soft and non tender to palpation, no distention noted, normoactive bowel sounds present in all four quadrants. Pt states bowel movements have been regular.  : Pt denies any pain or frequency with urination.  NEURO: Pt opens eyes spontaneously, behavior appropriate to situation, follows commands, facial expression symmetrical, bilateral hand grasp equal and even, purposeful motor response noted, normal sensation in all extremities when touched with a finger.

## 2020-11-12 PROBLEM — M79.89 PAIN AND SWELLING OF LOWER LEG, RIGHT: Status: ACTIVE | Noted: 2020-11-11

## 2020-11-12 PROBLEM — M79.661 PAIN AND SWELLING OF LOWER LEG, RIGHT: Status: ACTIVE | Noted: 2020-11-11

## 2020-11-12 LAB
ALBUMIN SERPL BCP-MCNC: 3.6 G/DL (ref 3.5–5.2)
ALP SERPL-CCNC: 78 U/L (ref 55–135)
ALT SERPL W/O P-5'-P-CCNC: 29 U/L (ref 10–44)
ANION GAP SERPL CALC-SCNC: 13 MMOL/L (ref 8–16)
AST SERPL-CCNC: 27 U/L (ref 10–40)
BASOPHILS # BLD AUTO: 0.02 K/UL (ref 0–0.2)
BASOPHILS NFR BLD: 0.2 % (ref 0–1.9)
BILIRUB SERPL-MCNC: 1.3 MG/DL (ref 0.1–1)
BUN SERPL-MCNC: 15 MG/DL (ref 6–20)
CALCIUM SERPL-MCNC: 8.7 MG/DL (ref 8.7–10.5)
CHLORIDE SERPL-SCNC: 103 MMOL/L (ref 95–110)
CO2 SERPL-SCNC: 23 MMOL/L (ref 23–29)
CREAT SERPL-MCNC: 1.1 MG/DL (ref 0.5–1.4)
DIFFERENTIAL METHOD: ABNORMAL
EOSINOPHIL # BLD AUTO: 0 K/UL (ref 0–0.5)
EOSINOPHIL NFR BLD: 0 % (ref 0–8)
ERYTHROCYTE [DISTWIDTH] IN BLOOD BY AUTOMATED COUNT: 12.6 % (ref 11.5–14.5)
EST. GFR  (AFRICAN AMERICAN): >60 ML/MIN/1.73 M^2
EST. GFR  (NON AFRICAN AMERICAN): >60 ML/MIN/1.73 M^2
GLUCOSE SERPL-MCNC: 82 MG/DL (ref 70–110)
HCT VFR BLD AUTO: 42.2 % (ref 40–54)
HGB BLD-MCNC: 14 G/DL (ref 14–18)
IMM GRANULOCYTES # BLD AUTO: 0.05 K/UL (ref 0–0.04)
IMM GRANULOCYTES NFR BLD AUTO: 0.5 % (ref 0–0.5)
LYMPHOCYTES # BLD AUTO: 1.7 K/UL (ref 1–4.8)
LYMPHOCYTES NFR BLD: 15.6 % (ref 18–48)
MAGNESIUM SERPL-MCNC: 2.2 MG/DL (ref 1.6–2.6)
MCH RBC QN AUTO: 30.2 PG (ref 27–31)
MCHC RBC AUTO-ENTMCNC: 33.2 G/DL (ref 32–36)
MCV RBC AUTO: 91 FL (ref 82–98)
MONOCYTES # BLD AUTO: 1.6 K/UL (ref 0.3–1)
MONOCYTES NFR BLD: 14.7 % (ref 4–15)
NEUTROPHILS # BLD AUTO: 7.3 K/UL (ref 1.8–7.7)
NEUTROPHILS NFR BLD: 69 % (ref 38–73)
NRBC BLD-RTO: 0 /100 WBC
PHOSPHATE SERPL-MCNC: 4.2 MG/DL (ref 2.7–4.5)
PLATELET # BLD AUTO: 189 K/UL (ref 150–350)
PLATELET BLD QL SMEAR: ABNORMAL
PMV BLD AUTO: 8.6 FL (ref 9.2–12.9)
POTASSIUM SERPL-SCNC: 4 MMOL/L (ref 3.5–5.1)
PROT SERPL-MCNC: 7.3 G/DL (ref 6–8.4)
RBC # BLD AUTO: 4.64 M/UL (ref 4.6–6.2)
SODIUM SERPL-SCNC: 139 MMOL/L (ref 136–145)
WBC # BLD AUTO: 10.61 K/UL (ref 3.9–12.7)

## 2020-11-12 PROCEDURE — 85025 COMPLETE CBC W/AUTO DIFF WBC: CPT

## 2020-11-12 PROCEDURE — 99203 PR OFFICE/OUTPT VISIT, NEW, LEVL III, 30-44 MIN: ICD-10-PCS | Mod: ,,, | Performed by: ORTHOPAEDIC SURGERY

## 2020-11-12 PROCEDURE — 36415 COLL VENOUS BLD VENIPUNCTURE: CPT

## 2020-11-12 PROCEDURE — G0378 HOSPITAL OBSERVATION PER HR: HCPCS

## 2020-11-12 PROCEDURE — 83735 ASSAY OF MAGNESIUM: CPT

## 2020-11-12 PROCEDURE — 25000003 PHARM REV CODE 250: Performed by: NURSE PRACTITIONER

## 2020-11-12 PROCEDURE — 96376 TX/PRO/DX INJ SAME DRUG ADON: CPT | Performed by: EMERGENCY MEDICINE

## 2020-11-12 PROCEDURE — 99203 OFFICE O/P NEW LOW 30 MIN: CPT | Mod: ,,, | Performed by: ORTHOPAEDIC SURGERY

## 2020-11-12 PROCEDURE — 84100 ASSAY OF PHOSPHORUS: CPT

## 2020-11-12 PROCEDURE — 25000003 PHARM REV CODE 250: Performed by: PHYSICIAN ASSISTANT

## 2020-11-12 PROCEDURE — 80053 COMPREHEN METABOLIC PANEL: CPT

## 2020-11-12 PROCEDURE — 63600175 PHARM REV CODE 636 W HCPCS: Performed by: NURSE PRACTITIONER

## 2020-11-12 PROCEDURE — 96361 HYDRATE IV INFUSION ADD-ON: CPT | Performed by: EMERGENCY MEDICINE

## 2020-11-12 PROCEDURE — 96372 THER/PROPH/DIAG INJ SC/IM: CPT | Mod: 59 | Performed by: EMERGENCY MEDICINE

## 2020-11-12 PROCEDURE — 99226 PR SUBSEQUENT OBSERVATION CARE,LEVEL III: ICD-10-PCS | Mod: ,,, | Performed by: NURSE PRACTITIONER

## 2020-11-12 PROCEDURE — 99226 PR SUBSEQUENT OBSERVATION CARE,LEVEL III: CPT | Mod: ,,, | Performed by: NURSE PRACTITIONER

## 2020-11-12 RX ORDER — MAG HYDROX/ALUMINUM HYD/SIMETH 200-200-20
30 SUSPENSION, ORAL (FINAL DOSE FORM) ORAL EVERY 6 HOURS PRN
Status: DISCONTINUED | OUTPATIENT
Start: 2020-11-12 | End: 2020-11-14 | Stop reason: HOSPADM

## 2020-11-12 RX ADMIN — SODIUM CHLORIDE: 0.9 INJECTION, SOLUTION INTRAVENOUS at 12:11

## 2020-11-12 RX ADMIN — Medication 1 CAPSULE: at 09:11

## 2020-11-12 RX ADMIN — CLINDAMYCIN IN 5 PERCENT DEXTROSE 600 MG: 12 INJECTION, SOLUTION INTRAVENOUS at 06:11

## 2020-11-12 RX ADMIN — CLINDAMYCIN IN 5 PERCENT DEXTROSE 600 MG: 12 INJECTION, SOLUTION INTRAVENOUS at 05:11

## 2020-11-12 RX ADMIN — HYDROCODONE BITARTRATE AND ACETAMINOPHEN 1 TABLET: 5; 325 TABLET ORAL at 12:11

## 2020-11-12 RX ADMIN — HYDROCODONE BITARTRATE AND ACETAMINOPHEN 1 TABLET: 5; 325 TABLET ORAL at 05:11

## 2020-11-12 RX ADMIN — PANTOPRAZOLE SODIUM 40 MG: 40 TABLET, DELAYED RELEASE ORAL at 08:11

## 2020-11-12 RX ADMIN — HYDROCODONE BITARTRATE AND ACETAMINOPHEN 1 TABLET: 5; 325 TABLET ORAL at 11:11

## 2020-11-12 RX ADMIN — ENOXAPARIN SODIUM 40 MG: 40 INJECTION SUBCUTANEOUS at 05:11

## 2020-11-12 RX ADMIN — IRBESARTAN 150 MG: 150 TABLET ORAL at 08:11

## 2020-11-12 RX ADMIN — CLINDAMYCIN IN 5 PERCENT DEXTROSE 600 MG: 12 INJECTION, SOLUTION INTRAVENOUS at 11:11

## 2020-11-12 RX ADMIN — SODIUM CHLORIDE: 0.9 INJECTION, SOLUTION INTRAVENOUS at 02:11

## 2020-11-12 RX ADMIN — Medication 1 CAPSULE: at 11:11

## 2020-11-12 RX ADMIN — ACETAMINOPHEN 650 MG: 325 TABLET ORAL at 08:11

## 2020-11-12 RX ADMIN — CLINDAMYCIN IN 5 PERCENT DEXTROSE 600 MG: 12 INJECTION, SOLUTION INTRAVENOUS at 09:11

## 2020-11-12 NOTE — CONSULTS
[facility]  Orthopedics  Consult Note    Patient Name: Matthew Phoenix  MRN: [id number]  Admission Date: 11/11/2020  Hospital Length of Stay: 0 days  Attending Provider: Jame Holloway DO  Primary Care Provider: Jordi White MD    Patient information was obtained from patient and ER records.     Inpatient consult to Orthopedic Surgery  Consult performed by: Zafar Mccormack MD  Consult ordered by: Jame Holloway DO        Subjective:     Principal Problem:Right leg pain    Chief Complaint:   Chief Complaint   Patient presents with    Leg Pain     states sent to ER for US right leg/ pain +swelling        HPI: Matthew Phoenix is a 50 y.o. male with PMHx of open right ankle fracture (s/p ORIF in 1999) who presents to ED with RLE pain and swelling. The patient reports that Monday he began to notice mild pain and increased swelling on the entire medial side of his RLE from his groin to his ankle. The patient reports that yesterday the pain began to increase causing him to begin walking with a limp and prompting him to come to ED today. The pt reports that his RLE is slightly swollen at baseline since his accident in 1999. The patient denies fever, chills, N/V, and prior similar symptoms. The patient denies any preceding trauma. The patient denies hx of IVDU, immunocompromise, and DM. The patient is sexually active with 1 partner. The patient is a non smoker.      Past Medical History:   Diagnosis Date    Hypertension        Past Surgical History:   Procedure Laterality Date    FRACTURE SURGERY Right 1999    [facility]; Right lower leg compound fracture repair       Review of patient's allergies indicates:   Allergen Reactions    Penicillins        No current facility-administered medications for this encounter.      Current Outpatient Medications   Medication Sig    irbesartan-hydrochlorothiazide (AVALIDE) 150-12.5 mg per tablet Take 1 tablet by mouth once daily.    naproxen  "(NAPROSYN) 500 MG tablet Take 1 tablet (500 mg total) by mouth 2 (two) times daily with meals. (Patient not taking: Reported on 10/26/2020)    pantoprazole (PROTONIX) 40 MG tablet Take 1 tablet (40 mg total) by mouth once daily. (Patient not taking: Reported on 11/11/2020)    triamcinolone acetonide 0.1% (KENALOG) 0.1 % cream Apply topically 2 (two) times daily. (Patient not taking: Reported on 10/26/2020)     Family History     Problem Relation (Age of Onset)    Diabetes Sister    Eczema Daughter    Throat cancer Father        Tobacco Use    Smoking status: Never Smoker   Substance and Sexual Activity    Alcohol use: Yes    Drug use: Not on file    Sexual activity: Not on file     [facility] ED  Objective:     Vital Signs (Most Recent):  Temp: 99.4 °F (37.4 °C) (11/11/20 1534)  Pulse: 78 (11/11/20 1756)  Resp: 16 (11/11/20 1756)  BP: (!) 143/73 (11/11/20 1756)  SpO2: 99 % (11/11/20 1756) Vital Signs (24h Range):  Temp:  [99.4 °F (37.4 °C)-101.9 °F (38.8 °C)] 99.4 °F (37.4 °C)  Pulse:  [] 78  Resp:  [16-20] 16  SpO2:  [96 %-100 %] 99 %  BP: (122-143)/(72-84) 143/73     Weight: 97.5 kg (215 lb)  Height: 5' 9" (175.3 cm)  Body mass index is 31.75 kg/m².      Intake/Output Summary (Last 24 hours) at 11/11/2020 2115  Last data filed at 11/11/2020 1331  Gross per 24 hour   Intake 1000 ml   Output --   Net 1000 ml       Ortho/SPM Exam   PE:  Gen:  No acute distress  CV:  Peripherally well-perfused.    Lungs:  Normal respiratory effort.  Head/Neck:  Normocephalic.  Atraumatic.     RLE:  Skin intact, well healed incision about medial and lateral ankle with no signs of infection  No erythema or signs of infection about RLE  1+ pitting edema about leg  TTP about medial and lateral ankle  TTP about medial knee  TTP about medial thigh and groin  No TTP about lateral thigh, knee  TTP about calf  Full active and passive ROM of hip, knee and ankle without pain  Compartments soft  SILT Sa/Turner/DP/SP/T  Motor intact " EHL/FHL/TA/Gastroc  2+ DP pulse    All other joints (shoulder/elbow/wrist/hip/knee/ankle) were examined and had full ROM and were non-tender to palpation.        Significant Labs:   CBC:   Recent Labs   Lab 11/11/20  1223   WBC 13.72*   HGB 15.2   HCT 45.0        CMP:   Recent Labs   Lab 11/11/20  1223      K 4.2      CO2 22*   *   BUN 15   CREATININE 1.3   CALCIUM 9.7   PROT 8.5*   ALBUMIN 4.4   BILITOT 1.4*   ALKPHOS 77   AST 28   ALT 37   ANIONGAP 12   EGFRNONAA >60.0     All pertinent labs within the past 24 hours have been reviewed.    Significant Imaging: I have reviewed all pertinent imaging results/findings. XR of pelvis showing no acute abnormalities  MRI of pelvis showing mass within the subcutaneous tissue of the right medial thigh and additional smaller lesions in the bilateral inguinal regions.     Assessment/Plan:     * Right leg pain  Matthew Phoenix is a 50 y.o. male presenting with atraumatic, medial sided RLE pain x 2 days. The patient is having pain and TTP on the medial side of his RLE from his groin to his ankle which is worsened with ambulation, however he has no pain with R hip ROM, no pain with axial loading of his R hip, and no pain with R knee ROM. The patient was febrile (101.9) and tachycardic 110 on arrival, though his vitals are now stable. He has normal ESR, normal procal, elevated CRP (61.7), and mildly elevated WBC (13.7). MRI of pelvis did not show any significant hip effusion and his symptoms are not at all concerning for septic hip. His sx however do appear to be from an infectious etiology, and MRI was concerning for super-infected right inguinal lymph node. This does not appear to be an operative orthopaedic issue.    -Recommend admission to medicine for IV abx and further workup for infectious vs. Inflammatory vs. Oncologic etiology for his inguinal mass  -Empiric iv abx  -WBAT to BLE  -No acute orthopaedic intervention at this time          Thank you  for your consult. I will follow-up with patient. Please contact us if you have any additional questions.    Zafar Mccormack MD  Orthopedics  [facility]

## 2020-11-12 NOTE — SUBJECTIVE & OBJECTIVE
Past Medical History:   Diagnosis Date    Hypertension        Past Surgical History:   Procedure Laterality Date    FRACTURE SURGERY Right 1999    [facility]; Right lower leg compound fracture repair       Review of patient's allergies indicates:   Allergen Reactions    Penicillins        No current facility-administered medications on file prior to encounter.      Current Outpatient Medications on File Prior to Encounter   Medication Sig    irbesartan-hydrochlorothiazide (AVALIDE) 150-12.5 mg per tablet Take 1 tablet by mouth once daily.    naproxen (NAPROSYN) 500 MG tablet Take 1 tablet (500 mg total) by mouth 2 (two) times daily with meals. (Patient not taking: Reported on 10/26/2020)    pantoprazole (PROTONIX) 40 MG tablet Take 1 tablet (40 mg total) by mouth once daily. (Patient not taking: Reported on 11/11/2020)    triamcinolone acetonide 0.1% (KENALOG) 0.1 % cream Apply topically 2 (two) times daily. (Patient not taking: Reported on 10/26/2020)     Family History     Problem Relation (Age of Onset)    Diabetes Sister    Eczema Daughter    Throat cancer Father        Tobacco Use    Smoking status: Never Smoker   Substance and Sexual Activity    Alcohol use: Yes    Drug use: Not on file    Sexual activity: Not on file     Review of Systems   Constitutional: Positive for activity change and fever. Negative for chills.   HENT: Negative for ear discharge and ear pain.    Eyes: Negative for pain and itching.   Respiratory: Negative for cough and shortness of breath.    Cardiovascular: Positive for leg swelling. Negative for chest pain.   Gastrointestinal: Negative for abdominal pain and anal bleeding.   Genitourinary: Negative for dysuria and enuresis.   Musculoskeletal: Positive for arthralgias and myalgias.   Skin: Negative for rash and wound.   Allergic/Immunologic: Negative for immunocompromised state.   Neurological: Negative for speech difficulty and weakness.   Hematological: Positive for  adenopathy.     Objective:     Vital Signs (Most Recent):  Temp: (!) 102.7 °F (39.3 °C) (11/11/20 2302)  Pulse: 99 (11/11/20 2210)  Resp: 18 (11/11/20 2210)  BP: (!) 152/87 (11/11/20 2210)  SpO2: 99 % (11/11/20 2210) Vital Signs (24h Range):  Temp:  [99.4 °F (37.4 °C)-102.7 °F (39.3 °C)] 102.7 °F (39.3 °C)  Pulse:  [] 99  Resp:  [16-20] 18  SpO2:  [96 %-100 %] 99 %  BP: (122-152)/(72-87) 152/87     Weight: 97.5 kg (215 lb)  Body mass index is 31.75 kg/m².    Physical Exam  Vitals signs and nursing note reviewed.   Constitutional:       Appearance: Normal appearance.   HENT:      Head: Normocephalic and atraumatic.   Eyes:      Extraocular Movements: Extraocular movements intact.      Pupils: Pupils are equal, round, and reactive to light.   Neck:      Musculoskeletal: Normal range of motion and neck supple.   Cardiovascular:      Rate and Rhythm: Normal rate and regular rhythm.   Pulmonary:      Effort: Pulmonary effort is normal.      Breath sounds: Normal breath sounds.   Abdominal:      General: Abdomen is flat.      Palpations: Abdomen is soft.   Musculoskeletal:         General: Swelling and tenderness present.      Right lower leg: Edema present.   Skin:     General: Skin is warm and dry.   Neurological:      General: No focal deficit present.      Mental Status: He is alert and oriented to person, place, and time.           CRANIAL NERVES     CN III, IV, VI   Pupils are equal, round, and reactive to light.       Significant Labs:   Blood Culture: No results for input(s): LABBLOO in the last 48 hours.  CBC:   Recent Labs   Lab 11/11/20  1223   WBC 13.72*   HGB 15.2   HCT 45.0        CMP:   Recent Labs   Lab 11/11/20  1223      K 4.2      CO2 22*   *   BUN 15   CREATININE 1.3   CALCIUM 9.7   PROT 8.5*   ALBUMIN 4.4   BILITOT 1.4*   ALKPHOS 77   AST 28   ALT 37   ANIONGAP 12   EGFRNONAA >60.0     Lactic Acid:   Recent Labs   Lab 11/11/20  1223   LACTATE 1.6     Magnesium:   Recent  Labs   Lab 11/11/20  1223   MG 2.0     Urine Studies:   Recent Labs   Lab 11/11/20  1239   COLORU Beba   APPEARANCEUA Hazy*   PHUR 5.0   SPECGRAV 1.030   PROTEINUA Negative   GLUCUA Negative   KETONESU Negative   BILIRUBINUA Negative   OCCULTUA Negative   NITRITE Negative   LEUKOCYTESUR Negative       Significant Imaging: I have reviewed and interpreted all pertinent imaging results/findings within the past 24 hours.     US Felipe LE 11-11-20 result:  No DVT    MRI Pelvis w w/o contrast 11-11-20 results:  Enhancing 5.6 x 1.6 cm T2 hyperintense/T1 isointense mass within the subcutaneous tissue of the right medial thigh and additional smaller lesions in the bilateral inguinal regions.  Stranding adjacent to the dominant lesion in the right inguinal region.  The findings may represent superinfected right-sided inguinal lymph node.  No definitive abscess.

## 2020-11-12 NOTE — PLAN OF CARE
11/12/20 1311   Discharge Assessment   Assessment Type Discharge Planning Assessment   Confirmed/corrected address and phone number on facesheet? Yes   Assessment information obtained from? Patient   Expected Length of Stay (days) 2   Communicated expected length of stay with patient/caregiver yes   Prior to hospitilization cognitive status: Alert/Oriented   Prior to hospitalization functional status: Independent   Current cognitive status: Alert/Oriented   Current Functional Status: Independent   Lives With other relative(s)   Able to Return to Prior Arrangements yes   Is patient able to care for self after discharge? Yes   Patient's perception of discharge disposition home or selfcare   Patient currently being followed by outpatient case management? No   Patient currently receives any other outside agency services? No   Equipment Currently Used at Home none   Do you have any problems affording any of your prescribed medications? No   Is the patient taking medications as prescribed? yes   Does the patient have transportation home? Yes   Transportation Anticipated family or friend will provide   Does the patient receive services at the [facility]? No   Discharge Plan A Home with family   Discharge Plan B Home with family   DME Needed Upon Discharge  none   Patient/Family in Agreement with Plan no     Jordi White MD      [id number] - [place] - [address] AT [facility] [address]  [address] [address]  Phone: [phone] Fax: [phone]    Elizabeth Weinstein LMSW [facility]  [id number]

## 2020-11-12 NOTE — ASSESSMENT & PLAN NOTE
Matthew Phoenix is a 50 y.o. male presenting with atraumatic, medial sided RLE pain x 2 days. The patient is having pain and TTP on the medial side of his RLE from his groin to his ankle which is worsened with ambulation, however he has no pain with R hip ROM, no pain with axial loading of his R hip, and no pain with R knee ROM. The patient was febrile (101.9) and tachycardic 110 on arrival, though his vitals are now stable. He has normal ESR, normal procal, elevated CRP (61.7), and mildly elevated WBC (13.7). MRI of pelvis did not show any significant hip effusion and his symptoms are not at all concerning for septic hip. His sx however do appear to be from an infectious etiology, and MRI was concerning for super-infected right inguinal lymph node. This does not appear to be an operative orthopaedic issue.    -Recommend admission to medicine for IV abx and further workup for infectious vs. Inflammatory vs. Oncologic etiology for his inguinal mass  -Empiric iv abx  -WBAT to BLE  -No acute orthopaedic intervention at this time

## 2020-11-12 NOTE — SUBJECTIVE & OBJECTIVE
"Past Medical History:   Diagnosis Date    Hypertension        Past Surgical History:   Procedure Laterality Date    FRACTURE SURGERY Right 1999    [facility]; Right lower leg compound fracture repair       Review of patient's allergies indicates:   Allergen Reactions    Penicillins        No current facility-administered medications for this encounter.      Current Outpatient Medications   Medication Sig    irbesartan-hydrochlorothiazide (AVALIDE) 150-12.5 mg per tablet Take 1 tablet by mouth once daily.    naproxen (NAPROSYN) 500 MG tablet Take 1 tablet (500 mg total) by mouth 2 (two) times daily with meals. (Patient not taking: Reported on 10/26/2020)    pantoprazole (PROTONIX) 40 MG tablet Take 1 tablet (40 mg total) by mouth once daily. (Patient not taking: Reported on 11/11/2020)    triamcinolone acetonide 0.1% (KENALOG) 0.1 % cream Apply topically 2 (two) times daily. (Patient not taking: Reported on 10/26/2020)     Family History     Problem Relation (Age of Onset)    Diabetes Sister    Eczema Daughter    Throat cancer Father        Tobacco Use    Smoking status: Never Smoker   Substance and Sexual Activity    Alcohol use: Yes    Drug use: Not on file    Sexual activity: Not on file     [facility]per ED  Objective:     Vital Signs (Most Recent):  Temp: 99.4 °F (37.4 °C) (11/11/20 1534)  Pulse: 78 (11/11/20 1756)  Resp: 16 (11/11/20 1756)  BP: (!) 143/73 (11/11/20 1756)  SpO2: 99 % (11/11/20 1756) Vital Signs (24h Range):  Temp:  [99.4 °F (37.4 °C)-101.9 °F (38.8 °C)] 99.4 °F (37.4 °C)  Pulse:  [] 78  Resp:  [16-20] 16  SpO2:  [96 %-100 %] 99 %  BP: (122-143)/(72-84) 143/73     Weight: 97.5 kg (215 lb)  Height: 5' 9" (175.3 cm)  Body mass index is 31.75 kg/m².      Intake/Output Summary (Last 24 hours) at 11/11/2020 2115  Last data filed at 11/11/2020 1331  Gross per 24 hour   Intake 1000 ml   Output --   Net 1000 ml       Ortho/SPM Exam   PE:  Gen:  No acute distress  CV:  Peripherally " well-perfused.    Lungs:  Normal respiratory effort.  Head/Neck:  Normocephalic.  Atraumatic.     RLE:  Skin intact, well healed incision about medial and lateral ankle with no signs of infection  No erythema or signs of infection about RLE  1+ pitting edema about leg  TTP about medial and lateral ankle  TTP about medial knee  TTP about medial thigh and groin  No TTP about lateral thigh, knee  TTP about calf  Full active and passive ROM of hip, knee and ankle without pain  Compartments soft  SILT Sa/Turner/DP/SP/T  Motor intact EHL/FHL/TA/Gastroc  2+ DP pulse    All other joints (shoulder/elbow/wrist/hip/knee/ankle) were examined and had full ROM and were non-tender to palpation.        Significant Labs:   CBC:   Recent Labs   Lab 11/11/20  1223   WBC 13.72*   HGB 15.2   HCT 45.0        CMP:   Recent Labs   Lab 11/11/20  1223      K 4.2      CO2 22*   *   BUN 15   CREATININE 1.3   CALCIUM 9.7   PROT 8.5*   ALBUMIN 4.4   BILITOT 1.4*   ALKPHOS 77   AST 28   ALT 37   ANIONGAP 12   EGFRNONAA >60.0     All pertinent labs within the past 24 hours have been reviewed.    Significant Imaging: I have reviewed all pertinent imaging results/findings. XR of pelvis showing no acute abnormalities  MRI of pelvis showing mass within the subcutaneous tissue of the right medial thigh and additional smaller lesions in the bilateral inguinal regions.

## 2020-11-12 NOTE — HPI
Matthew Phoenix is a 50 y.o. male with PMHx of open right ankle fracture (s/p ORIF in 1999) who presents to ED with RLE pain and swelling. The patient reports that Monday he began to notice mild pain and increased swelling on the entire medial side of his RLE from his groin to his ankle. The patient reports that yesterday the pain began to increase causing him to begin walking with a limp and prompting him to come to ED today. The pt reports that his RLE is slightly swollen at baseline since his accident in 1999. The patient denies fever, chills, N/V, and prior similar symptoms. The patient denies any preceding trauma. The patient denies hx of IVDU, immunocompromise, and DM. The patient is sexually active with 1 partner. The patient is a non smoker.

## 2020-11-12 NOTE — H&P
"[facility] Hospital Medicine  History & Physical    Patient Name: Matthew Phoenix  MRN: [id number]  Admission Date: 11/11/2020  Attending Physician: Jordi Finch MD   Primary Care Provider: Jordi White MD    [facility] Medicine Team: [facility] F Jennifer Pandey NP     Patient information was obtained from patient, past medical records and ER records.     Subjective:     Principal Problem:Enlarged lymph node    Chief Complaint:   Chief Complaint   Patient presents with    Leg Pain     states sent to ER for US right leg/ pain +swelling        HPI: 49 y/o male who presented to the [facility] on 11-11-20 due to 2 day history of right leg pain and 1 day for fever, he was sent to ED to be further evaluated.  Patient has a lump in his right groin that is tender to touch, slight warmth. Patient states his whole leg hurts. Patient has some residual swelling in the right leg from accident in 1999. The patient reports that yesterday the pain began to increase causing him to begin walking with a limp and prompting him to go to Urgent Care.    He was seen by Ortho in the ED and multiple imaging studies done. Per their note "atraumatic, medial sided RLE pain x 2 days. The patient is having pain and TTP on the medial side of his RLE from his groin to his ankle which is worsened with ambulation, however he has no pain with R hip ROM, no pain with axial loading of his R hip, and no pain with R knee ROM. The patient was febrile (101.9) and tachycardic 110 on arrival, though his vitals are now stable. He has normal ESR, normal procal, elevated CRP (61.7), and mildly elevated WBC (13.7). MRI of pelvis did not show any significant hip effusion and his symptoms are not at all concerning for septic hip. His sx however do appear to be from an infectious etiology, and MRI was concerning for super-infected   right inguinal lymph node. This does not appear to be an operative orthopaedic " "issue"    Patient stared on Clindimycin IV.   Infectious w/u started with BC x2, UC, CRP 61.7  ESR 21 , Procalcitonin 0.19. lactic 1.6      Past Medical History:   Diagnosis Date    Hypertension        Past Surgical History:   Procedure Laterality Date    FRACTURE SURGERY Right 1999    [facility]; Right lower leg compound fracture repair       Review of patient's allergies indicates:   Allergen Reactions    Penicillins        No current facility-administered medications on file prior to encounter.      Current Outpatient Medications on File Prior to Encounter   Medication Sig    irbesartan-hydrochlorothiazide (AVALIDE) 150-12.5 mg per tablet Take 1 tablet by mouth once daily.    naproxen (NAPROSYN) 500 MG tablet Take 1 tablet (500 mg total) by mouth 2 (two) times daily with meals. (Patient not taking: Reported on 10/26/2020)    pantoprazole (PROTONIX) 40 MG tablet Take 1 tablet (40 mg total) by mouth once daily. (Patient not taking: Reported on 11/11/2020)    triamcinolone acetonide 0.1% (KENALOG) 0.1 % cream Apply topically 2 (two) times daily. (Patient not taking: Reported on 10/26/2020)     Family History     Problem Relation (Age of Onset)    Diabetes Sister    Eczema Daughter    Throat cancer Father        Tobacco Use    Smoking status: Never Smoker   Substance and Sexual Activity    Alcohol use: Yes    Drug use: Not on file    Sexual activity: Not on file     Review of Systems   Constitutional: Positive for activity change and fever. Negative for chills.   HENT: Negative for ear discharge and ear pain.    Eyes: Negative for pain and itching.   Respiratory: Negative for cough and shortness of breath.    Cardiovascular: Positive for leg swelling. Negative for chest pain.   Gastrointestinal: Negative for abdominal pain and anal bleeding.   Genitourinary: Negative for dysuria and enuresis.   Musculoskeletal: Positive for arthralgias and myalgias.   Skin: Negative for rash and wound. "   Allergic/Immunologic: Negative for immunocompromised state.   Neurological: Negative for speech difficulty and weakness.   Hematological: Positive for adenopathy.     Objective:     Vital Signs (Most Recent):  Temp: (!) 102.7 °F (39.3 °C) (11/11/20 2302)  Pulse: 99 (11/11/20 2210)  Resp: 18 (11/11/20 2210)  BP: (!) 152/87 (11/11/20 2210)  SpO2: 99 % (11/11/20 2210) Vital Signs (24h Range):  Temp:  [99.4 °F (37.4 °C)-102.7 °F (39.3 °C)] 102.7 °F (39.3 °C)  Pulse:  [] 99  Resp:  [16-20] 18  SpO2:  [96 %-100 %] 99 %  BP: (122-152)/(72-87) 152/87     Weight: 97.5 kg (215 lb)  Body mass index is 31.75 kg/m².    Physical Exam  Vitals signs and nursing note reviewed.   Constitutional:       Appearance: Normal appearance.   HENT:      Head: Normocephalic and atraumatic.   Eyes:      Extraocular Movements: Extraocular movements intact.      Pupils: Pupils are equal, round, and reactive to light.   Neck:      Musculoskeletal: Normal range of motion and neck supple.   Cardiovascular:      Rate and Rhythm: Normal rate and regular rhythm.   Pulmonary:      Effort: Pulmonary effort is normal.      Breath sounds: Normal breath sounds.   Abdominal:      General: Abdomen is flat.      Palpations: Abdomen is soft.   Musculoskeletal:         General: Swelling and tenderness present.      Right lower leg: Edema present.   Skin:     General: Skin is warm and dry.   Neurological:      General: No focal deficit present.      Mental Status: He is alert and oriented to person, place, and time.           CRANIAL NERVES     CN III, IV, VI   Pupils are equal, round, and reactive to light.       Significant Labs:   Blood Culture: No results for input(s): LABBLOO in the last 48 hours.  CBC:   Recent Labs   Lab 11/11/20  1223   WBC 13.72*   HGB 15.2   HCT 45.0        CMP:   Recent Labs   Lab 11/11/20  1223      K 4.2      CO2 22*   *   BUN 15   CREATININE 1.3   CALCIUM 9.7   PROT 8.5*   ALBUMIN 4.4   BILITOT 1.4*    ALKPHOS 77   AST 28   ALT 37   ANIONGAP 12   EGFRNONAA >60.0     Lactic Acid:   Recent Labs   Lab 11/11/20  1223   LACTATE 1.6     Magnesium:   Recent Labs   Lab 11/11/20  1223   MG 2.0     Urine Studies:   Recent Labs   Lab 11/11/20  1239   COLORU Beba   APPEARANCEUA Hazy*   PHUR 5.0   SPECGRAV 1.030   PROTEINUA Negative   GLUCUA Negative   KETONESU Negative   BILIRUBINUA Negative   OCCULTUA Negative   NITRITE Negative   LEUKOCYTESUR Negative       Significant Imaging: I have reviewed and interpreted all pertinent imaging results/findings within the past 24 hours.     US Felipe LE 11-11-20 result:  No DVT    MRI Pelvis w w/o contrast 11-11-20 results:  Enhancing 5.6 x 1.6 cm T2 hyperintense/T1 isointense mass within the subcutaneous tissue of the right medial thigh and additional smaller lesions in the bilateral inguinal regions.  Stranding adjacent to the dominant lesion in the right inguinal region.  The findings may represent superinfected right-sided inguinal lymph node.  No definitive abscess.    Assessment/Plan:     * Enlarged lymph node  51 y/o male with 2 day history of right leg pain and swelling with lump in groin and 1 day of fever. Went to Urgent Care and sent to ED. US Felipe LR no DVT. Seen by Ortho and no septic joint.    - admit to OBS  - infectious w/u in progress with BC x2 and UC still pending  - Clindimycin 600 mg IV Q6H  - tylenol for fever  - pain management      Right leg pain  Seen  per ortho      Hyperlipidemia, mixed        Essential hypertension  Continue home irbesartan, hold HCTZ for now      VTE Risk Mitigation (From admission, onward)         Ordered     enoxaparin injection 40 mg  Every 24 hours      11/11/20 2316     IP VTE HIGH RISK PATIENT  Once      11/11/20 2316     Place sequential compression device  Until discontinued      11/11/20 2316                   Jennifer Pandey NP  Department of Hospital Medicine   [facility]

## 2020-11-12 NOTE — ASSESSMENT & PLAN NOTE
49 y/o male with 2 day history of right leg pain and swelling with lump in groin and 1 day of fever. Went to Urgent Care and sent to ED. US Felipe LR no DVT. Seen by Ortho and no septic joint.    - admit to OBS  - infectious w/u in progress with BC x2 and UC still pending  - Clindimycin 600 mg IV Q6H  - tylenol for fever  - pain management

## 2020-11-13 PROBLEM — R79.89 ELEVATED LFTS: Status: ACTIVE | Noted: 2020-11-13

## 2020-11-13 LAB
ALBUMIN SERPL BCP-MCNC: 3.3 G/DL (ref 3.5–5.2)
ALP SERPL-CCNC: 117 U/L (ref 55–135)
ALT SERPL W/O P-5'-P-CCNC: 93 U/L (ref 10–44)
ANION GAP SERPL CALC-SCNC: 9 MMOL/L (ref 8–16)
AST SERPL-CCNC: 86 U/L (ref 10–40)
BASOPHILS # BLD AUTO: 0.02 K/UL (ref 0–0.2)
BASOPHILS NFR BLD: 0.3 % (ref 0–1.9)
BILIRUB SERPL-MCNC: 0.6 MG/DL (ref 0.1–1)
BUN SERPL-MCNC: 11 MG/DL (ref 6–20)
CALCIUM SERPL-MCNC: 8.6 MG/DL (ref 8.7–10.5)
CHLORIDE SERPL-SCNC: 105 MMOL/L (ref 95–110)
CO2 SERPL-SCNC: 22 MMOL/L (ref 23–29)
CREAT SERPL-MCNC: 1 MG/DL (ref 0.5–1.4)
DIFFERENTIAL METHOD: ABNORMAL
EOSINOPHIL # BLD AUTO: 0 K/UL (ref 0–0.5)
EOSINOPHIL NFR BLD: 0.5 % (ref 0–8)
ERYTHROCYTE [DISTWIDTH] IN BLOOD BY AUTOMATED COUNT: 12.5 % (ref 11.5–14.5)
EST. GFR  (AFRICAN AMERICAN): >60 ML/MIN/1.73 M^2
EST. GFR  (NON AFRICAN AMERICAN): >60 ML/MIN/1.73 M^2
GLUCOSE SERPL-MCNC: 132 MG/DL (ref 70–110)
HCT VFR BLD AUTO: 37.9 % (ref 40–54)
HGB BLD-MCNC: 12.7 G/DL (ref 14–18)
IMM GRANULOCYTES # BLD AUTO: 0.04 K/UL (ref 0–0.04)
IMM GRANULOCYTES NFR BLD AUTO: 0.5 % (ref 0–0.5)
LYMPHOCYTES # BLD AUTO: 1.9 K/UL (ref 1–4.8)
LYMPHOCYTES NFR BLD: 25.9 % (ref 18–48)
MAGNESIUM SERPL-MCNC: 2.1 MG/DL (ref 1.6–2.6)
MCH RBC QN AUTO: 30.1 PG (ref 27–31)
MCHC RBC AUTO-ENTMCNC: 33.5 G/DL (ref 32–36)
MCV RBC AUTO: 90 FL (ref 82–98)
MONOCYTES # BLD AUTO: 0.9 K/UL (ref 0.3–1)
MONOCYTES NFR BLD: 11.9 % (ref 4–15)
NEUTROPHILS # BLD AUTO: 4.4 K/UL (ref 1.8–7.7)
NEUTROPHILS NFR BLD: 60.9 % (ref 38–73)
NRBC BLD-RTO: 0 /100 WBC
PHOSPHATE SERPL-MCNC: 2.9 MG/DL (ref 2.7–4.5)
PLATELET # BLD AUTO: 173 K/UL (ref 150–350)
PMV BLD AUTO: 8.5 FL (ref 9.2–12.9)
POTASSIUM SERPL-SCNC: 3.8 MMOL/L (ref 3.5–5.1)
PROT SERPL-MCNC: 6.9 G/DL (ref 6–8.4)
RBC # BLD AUTO: 4.22 M/UL (ref 4.6–6.2)
SODIUM SERPL-SCNC: 136 MMOL/L (ref 136–145)
WBC # BLD AUTO: 7.29 K/UL (ref 3.9–12.7)

## 2020-11-13 PROCEDURE — 83735 ASSAY OF MAGNESIUM: CPT

## 2020-11-13 PROCEDURE — 25500020 PHARM REV CODE 255: Performed by: INTERNAL MEDICINE

## 2020-11-13 PROCEDURE — 96361 HYDRATE IV INFUSION ADD-ON: CPT | Performed by: EMERGENCY MEDICINE

## 2020-11-13 PROCEDURE — G0378 HOSPITAL OBSERVATION PER HR: HCPCS

## 2020-11-13 PROCEDURE — 80053 COMPREHEN METABOLIC PANEL: CPT

## 2020-11-13 PROCEDURE — 99226 PR SUBSEQUENT OBSERVATION CARE,LEVEL III: ICD-10-PCS | Mod: ,,, | Performed by: NURSE PRACTITIONER

## 2020-11-13 PROCEDURE — 96376 TX/PRO/DX INJ SAME DRUG ADON: CPT | Mod: 59 | Performed by: EMERGENCY MEDICINE

## 2020-11-13 PROCEDURE — 36415 COLL VENOUS BLD VENIPUNCTURE: CPT

## 2020-11-13 PROCEDURE — 63600175 PHARM REV CODE 636 W HCPCS: Performed by: NURSE PRACTITIONER

## 2020-11-13 PROCEDURE — 96372 THER/PROPH/DIAG INJ SC/IM: CPT | Mod: 59 | Performed by: EMERGENCY MEDICINE

## 2020-11-13 PROCEDURE — 25000003 PHARM REV CODE 250: Performed by: NURSE PRACTITIONER

## 2020-11-13 PROCEDURE — 85025 COMPLETE CBC W/AUTO DIFF WBC: CPT

## 2020-11-13 PROCEDURE — 99226 PR SUBSEQUENT OBSERVATION CARE,LEVEL III: CPT | Mod: ,,, | Performed by: NURSE PRACTITIONER

## 2020-11-13 PROCEDURE — 84100 ASSAY OF PHOSPHORUS: CPT

## 2020-11-13 RX ORDER — POTASSIUM CHLORIDE 750 MG/1
20 CAPSULE, EXTENDED RELEASE ORAL ONCE
Status: COMPLETED | OUTPATIENT
Start: 2020-11-13 | End: 2020-11-13

## 2020-11-13 RX ADMIN — HYDROCODONE BITARTRATE AND ACETAMINOPHEN 1 TABLET: 5; 325 TABLET ORAL at 09:11

## 2020-11-13 RX ADMIN — CLINDAMYCIN IN 5 PERCENT DEXTROSE 600 MG: 12 INJECTION, SOLUTION INTRAVENOUS at 05:11

## 2020-11-13 RX ADMIN — PANTOPRAZOLE SODIUM 40 MG: 40 TABLET, DELAYED RELEASE ORAL at 08:11

## 2020-11-13 RX ADMIN — Medication 1 CAPSULE: at 08:11

## 2020-11-13 RX ADMIN — IRBESARTAN 150 MG: 150 TABLET ORAL at 08:11

## 2020-11-13 RX ADMIN — CLINDAMYCIN IN 5 PERCENT DEXTROSE 600 MG: 12 INJECTION, SOLUTION INTRAVENOUS at 09:11

## 2020-11-13 RX ADMIN — POTASSIUM CHLORIDE 20 MEQ: 750 CAPSULE, EXTENDED RELEASE ORAL at 09:11

## 2020-11-13 RX ADMIN — IOHEXOL 75 ML: 350 INJECTION, SOLUTION INTRAVENOUS at 02:11

## 2020-11-13 RX ADMIN — CLINDAMYCIN IN 5 PERCENT DEXTROSE 600 MG: 12 INJECTION, SOLUTION INTRAVENOUS at 11:11

## 2020-11-13 RX ADMIN — ENOXAPARIN SODIUM 40 MG: 40 INJECTION SUBCUTANEOUS at 05:11

## 2020-11-13 RX ADMIN — HYDROCODONE BITARTRATE AND ACETAMINOPHEN 1 TABLET: 5; 325 TABLET ORAL at 08:11

## 2020-11-13 RX ADMIN — SODIUM CHLORIDE: 0.9 INJECTION, SOLUTION INTRAVENOUS at 08:11

## 2020-11-13 NOTE — ASSESSMENT & PLAN NOTE
51 y/o male with 2 day history of right leg pain and swelling with lump in groin and 1 day of fever. Went to Urgent Care and sent to ED. US Felipe LR no DVT. Seen by Ortho and no septic joint.    - admit to OBS  - URine negative  - infectious w/u in progress with BC x2 and UC still pending  - Clindimycin 600 mg IV Q6H  - tylenol for fever  - pain management

## 2020-11-13 NOTE — PROGRESS NOTES
"[facility] Hospital Medicine  Progress Note    Patient Name: Matthew Phoenix  MRN: [id number]  Patient Class: OP- Observation   Admission Date: 11/11/2020  Length of Stay: 0 days  Attending Physician: Rupesh Farrell MD  Primary Care Provider: Jordi White MD    [facility] Medicine Team: [facility] MED  Laurita Zimmer NP    Subjective:     Principal Problem:Enlarged lymph node        HPI:  51 y/o male who presented to the [facility] on 11-11-20 due to 2 day history of right leg pain and 1 day for fever, he was sent to ED to be further evaluated.  Patient has a lump in his right groin that is tender to touch, slight warmth. Patient states his whole leg hurts. Patient has some residual swelling in the right leg from accident in 1999. The patient reports that yesterday the pain began to increase causing him to begin walking with a limp and prompting him to go to Urgent Care.    He was seen by Ortho in the ED and multiple imaging studies done. Per their note "atraumatic, medial sided RLE pain x 2 days. The patient is having pain and TTP on the medial side of his RLE from his groin to his ankle which is worsened with ambulation, however he has no pain with R hip ROM, no pain with axial loading of his R hip, and no pain with R knee ROM. The patient was febrile (101.9) and tachycardic 110 on arrival, though his vitals are now stable. He has normal ESR, normal procal, elevated CRP (61.7), and mildly elevated WBC (13.7). MRI of pelvis did not show any significant hip effusion and his symptoms are not at all concerning for septic hip. His sx however do appear to be from an infectious etiology, and MRI was concerning for super-infected   right inguinal lymph node. This does not appear to be an operative orthopaedic issue"    Patient stared on Clindimycin IV.   Infectious w/u started with BC x2, UC, CRP 61.7  ESR 21 , Procalcitonin 0.19. lactic 1.6      Overview/Hospital Course:  Placed in " observation for R groin / lymph node infection.  Febrile with leukocytosis noted on admit.  He was started in clindamycin and has responded well by defevervescing and resolution of his leukocytosis, in addition to decreased swelling and pain noted. Patient noted a small cut in his groin days prior with no open wounds, boils or ingrown hairs, he just felt like there had been a small cut and then days later the swelling was notable.     Dispo: home in 1-2 days to finish with po clinda, no evidence to suggest needing incision/ drainage    Interval History: Patient with improved swelling, decreased pain to right thigh/ groin area     Review of Systems   Constitutional: Positive for activity change and fever. Negative for chills.   HENT: Negative for ear discharge and ear pain.    Eyes: Negative for pain and itching.   Respiratory: Negative for cough and shortness of breath.    Cardiovascular: Positive for leg swelling. Negative for chest pain.   Gastrointestinal: Negative for abdominal pain and anal bleeding.   Genitourinary: Negative for dysuria and enuresis.   Musculoskeletal: Positive for arthralgias and myalgias.   Skin: Negative for rash and wound.   Allergic/Immunologic: Negative for immunocompromised state.   Neurological: Negative for speech difficulty and weakness.   Hematological: Positive for adenopathy.     Objective:     Vital Signs (Most Recent):  Temp: 98.9 °F (37.2 °C) (11/12/20 1547)  Pulse: 92 (11/12/20 1547)  Resp: 18 (11/12/20 1720)  BP: 123/74 (11/12/20 0738)  SpO2: 97 % (11/12/20 1547) Vital Signs (24h Range):  Temp:  [98.9 °F (37.2 °C)-102.7 °F (39.3 °C)] 98.9 °F (37.2 °C)  Pulse:  [] 92  Resp:  [16-20] 18  SpO2:  [92 %-99 %] 97 %  BP: (123-152)/(71-87) 123/74     Weight: 97.7 kg (215 lb 6.2 oz)  Body mass index is 31.81 kg/m².    Intake/Output Summary (Last 24 hours) at 11/12/2020 1027  Last data filed at 11/11/2020 0763  Gross per 24 hour   Intake 50 ml   Output --   Net 50 ml       Physical Exam  Vitals signs and nursing note reviewed.   Constitutional:       General: He is not in acute distress.     Appearance: Normal appearance. He is well-developed and normal weight. He is not diaphoretic.   HENT:      Head: Normocephalic and atraumatic.      Right Ear: External ear normal.      Left Ear: External ear normal.      Nose: Nose normal.      Mouth/Throat:      Mouth: Mucous membranes are moist.      Pharynx: Oropharynx is clear.   Eyes:      Extraocular Movements: Extraocular movements intact.      Conjunctiva/sclera: Conjunctivae normal.   Neck:      Musculoskeletal: Normal range of motion and neck supple.      Vascular: No JVD.   Cardiovascular:      Rate and Rhythm: Normal rate and regular rhythm.      Heart sounds: Normal heart sounds. No murmur.   Pulmonary:      Effort: Pulmonary effort is normal. No respiratory distress.      Breath sounds: Normal breath sounds. No wheezing or rales.   Abdominal:      General: Abdomen is flat. Bowel sounds are normal. There is no distension.      Palpations: Abdomen is soft. There is no mass.      Tenderness: There is no abdominal tenderness. There is no guarding.   Genitourinary:     Penis: Normal.    Musculoskeletal: Normal range of motion.         General: Swelling and tenderness present.      Right lower leg: Edema (chronic) present.   Skin:     General: Skin is warm and dry.      Findings: No erythema or rash.   Neurological:      General: No focal deficit present.      Mental Status: He is alert and oriented to person, place, and time.      Cranial Nerves: No cranial nerve deficit.      Sensory: No sensory deficit.      Coordination: Coordination normal.   Psychiatric:         Mood and Affect: Mood normal.         Behavior: Behavior normal.         Thought Content: Thought content normal.         Judgment: Judgment normal.         Significant Labs:   CBC:   Recent Labs   Lab 11/11/20  1223 11/12/20  0439   WBC 13.72* 10.61   HGB 15.2 14.0   HCT  45.0 42.2    189     CMP:   Recent Labs   Lab 11/11/20  1223 11/12/20  0439    139   K 4.2 4.0    103   CO2 22* 23   * 82   BUN 15 15   CREATININE 1.3 1.1   CALCIUM 9.7 8.7   PROT 8.5* 7.3   ALBUMIN 4.4 3.6   BILITOT 1.4* 1.3*   ALKPHOS 77 78   AST 28 27   ALT 37 29   ANIONGAP 12 13   EGFRNONAA >60.0 >60.0     Lactic Acid:   Recent Labs   Lab 11/11/20  1223   LACTATE 1.6       Significant Imaging: I have reviewed all pertinent imaging results/findings within the past 24 hours.      Assessment/Plan:      * Enlarged lymph node  49 y/o male with 2 day history of right leg pain and swelling with lump in groin and 1 day of fever. Went to Urgent Care and sent to ED. US Felipe LR no DVT. Seen by Ortho and no septic joint.    - admit to OBS  - URine negative  - infectious w/u in progress with BC x2 and UC still pending  - Clindimycin 600 mg IV Q6H  - tylenol for fever  - pain management      Right leg pain  Seen  per ortho  -Recommend admission to medicine for IV abx and further workup for infectious vs. Inflammatory vs. Oncologic etiology for his inguinal mass  -Empiric iv abx  -WBAT to BLE  -No acute orthopaedic intervention at this time       Hyperlipidemia, mixed        Essential hypertension  Continue home irbesartan, hold HCTZ for now      VTE Risk Mitigation (From admission, onward)         Ordered     enoxaparin injection 40 mg  Every 24 hours      11/11/20 2316     IP VTE HIGH RISK PATIENT  Once      11/11/20 2316     Place sequential compression device  Until discontinued      11/11/20 2316                Discharge Planning   MANDY: 11/14/2020     Code Status: Full Code   Is the patient medically ready for discharge?: No    Reason for patient still in hospital (select all that apply): Patient trending condition and Treatment  Discharge Plan A: Home with family                  Laurita Domitila Zimmer NP  Department of Hospital Medicine   [facility]

## 2020-11-13 NOTE — PROGRESS NOTES
CT R leg w/ no evidence of fluid collection    rec nonop management  abx    F/u in ortho clinic in 1-2 wks if pain worsens, does not improve    =====================  Prince Claire MD  Orthopaedic Surgery

## 2020-11-13 NOTE — HOSPITAL COURSE
"Placed in observation for R groin / lymph node infection.  Febrile with leukocytosis noted on admit.  He was started on clindamycin and responded well by defevervescing and resolution of his leukocytosis, in addition to decreased swelling and pain. Patient noted a small cut in his groin days prior with no open wounds, boils or ingrown hairs, he just felt like there had been a small cut and then days later the swelling was notable. CT of lower extremity ordered by Ortho; no evidence of infection, f/u Ortho in 1-2 weeks if pain worsens or does not improve.    LFT"s mildly elevated, RUQ US + hepatic steatosis, trending back down     Consulted infectious disease to discuss length of treatment with retained hardware and +/- IV abx. Po clinda 600mg po q8 x 10 day course end date 11/21/20    Dispo: home finish with abx, no evidence to suggest needing incision/ drainage  "

## 2020-11-13 NOTE — SUBJECTIVE & OBJECTIVE
Interval History: Patient with improved swelling, decreased pain to right thigh/ groin area     Review of Systems   Constitutional: Positive for activity change and fever. Negative for chills.   HENT: Negative for ear discharge and ear pain.    Eyes: Negative for pain and itching.   Respiratory: Negative for cough and shortness of breath.    Cardiovascular: Positive for leg swelling. Negative for chest pain.   Gastrointestinal: Negative for abdominal pain and anal bleeding.   Genitourinary: Negative for dysuria and enuresis.   Musculoskeletal: Positive for arthralgias and myalgias.   Skin: Negative for rash and wound.   Allergic/Immunologic: Negative for immunocompromised state.   Neurological: Negative for speech difficulty and weakness.   Hematological: Positive for adenopathy.     Objective:     Vital Signs (Most Recent):  Temp: 98.9 °F (37.2 °C) (11/12/20 1547)  Pulse: 92 (11/12/20 1547)  Resp: 18 (11/12/20 1720)  BP: 123/74 (11/12/20 0738)  SpO2: 97 % (11/12/20 1547) Vital Signs (24h Range):  Temp:  [98.9 °F (37.2 °C)-102.7 °F (39.3 °C)] 98.9 °F (37.2 °C)  Pulse:  [] 92  Resp:  [16-20] 18  SpO2:  [92 %-99 %] 97 %  BP: (123-152)/(71-87) 123/74     Weight: 97.7 kg (215 lb 6.2 oz)  Body mass index is 31.81 kg/m².    Intake/Output Summary (Last 24 hours) at 11/12/2020 1805  Last data filed at 11/11/2020 2211  Gross per 24 hour   Intake 50 ml   Output --   Net 50 ml      Physical Exam  Vitals signs and nursing note reviewed.   Constitutional:       General: He is not in acute distress.     Appearance: Normal appearance. He is well-developed and normal weight. He is not diaphoretic.   HENT:      Head: Normocephalic and atraumatic.      Right Ear: External ear normal.      Left Ear: External ear normal.      Nose: Nose normal.      Mouth/Throat:      Mouth: Mucous membranes are moist.      Pharynx: Oropharynx is clear.   Eyes:      Extraocular Movements: Extraocular movements intact.      Conjunctiva/sclera:  Conjunctivae normal.   Neck:      Musculoskeletal: Normal range of motion and neck supple.      Vascular: No JVD.   Cardiovascular:      Rate and Rhythm: Normal rate and regular rhythm.      Heart sounds: Normal heart sounds. No murmur.   Pulmonary:      Effort: Pulmonary effort is normal. No respiratory distress.      Breath sounds: Normal breath sounds. No wheezing or rales.   Abdominal:      General: Abdomen is flat. Bowel sounds are normal. There is no distension.      Palpations: Abdomen is soft. There is no mass.      Tenderness: There is no abdominal tenderness. There is no guarding.   Genitourinary:     Penis: Normal.    Musculoskeletal: Normal range of motion.         General: Swelling and tenderness present.      Right lower leg: Edema (chronic) present.   Skin:     General: Skin is warm and dry.      Findings: No erythema or rash.   Neurological:      General: No focal deficit present.      Mental Status: He is alert and oriented to person, place, and time.      Cranial Nerves: No cranial nerve deficit.      Sensory: No sensory deficit.      Coordination: Coordination normal.   Psychiatric:         Mood and Affect: Mood normal.         Behavior: Behavior normal.         Thought Content: Thought content normal.         Judgment: Judgment normal.         Significant Labs:   CBC:   Recent Labs   Lab 11/11/20  1223 11/12/20  0439   WBC 13.72* 10.61   HGB 15.2 14.0   HCT 45.0 42.2    189     CMP:   Recent Labs   Lab 11/11/20  1223 11/12/20  0439    139   K 4.2 4.0    103   CO2 22* 23   * 82   BUN 15 15   CREATININE 1.3 1.1   CALCIUM 9.7 8.7   PROT 8.5* 7.3   ALBUMIN 4.4 3.6   BILITOT 1.4* 1.3*   ALKPHOS 77 78   AST 28 27   ALT 37 29   ANIONGAP 12 13   EGFRNONAA >60.0 >60.0     Lactic Acid:   Recent Labs   Lab 11/11/20  1223   LACTATE 1.6       Significant Imaging: I have reviewed all pertinent imaging results/findings within the past 24 hours.

## 2020-11-13 NOTE — ASSESSMENT & PLAN NOTE
Seen  per ortho  -Recommend admission to medicine for IV abx and further workup for infectious vs. Inflammatory vs. Oncologic etiology for his inguinal mass  -Empiric iv abx  -WBAT to BLE  -No acute orthopaedic intervention at this time

## 2020-11-14 VITALS
OXYGEN SATURATION: 97 % | WEIGHT: 215.38 LBS | HEIGHT: 69 IN | DIASTOLIC BLOOD PRESSURE: 90 MMHG | BODY MASS INDEX: 31.9 KG/M2 | SYSTOLIC BLOOD PRESSURE: 138 MMHG | TEMPERATURE: 97 F | RESPIRATION RATE: 14 BRPM | HEART RATE: 77 BPM

## 2020-11-14 LAB
ALBUMIN SERPL BCP-MCNC: 3.4 G/DL (ref 3.5–5.2)
ALP SERPL-CCNC: 123 U/L (ref 55–135)
ALT SERPL W/O P-5'-P-CCNC: 89 U/L (ref 10–44)
ANION GAP SERPL CALC-SCNC: 9 MMOL/L (ref 8–16)
AST SERPL-CCNC: 52 U/L (ref 10–40)
BASOPHILS # BLD AUTO: 0.02 K/UL (ref 0–0.2)
BASOPHILS NFR BLD: 0.3 % (ref 0–1.9)
BILIRUB SERPL-MCNC: 0.6 MG/DL (ref 0.1–1)
BUN SERPL-MCNC: 12 MG/DL (ref 6–20)
CALCIUM SERPL-MCNC: 9.2 MG/DL (ref 8.7–10.5)
CHLORIDE SERPL-SCNC: 105 MMOL/L (ref 95–110)
CO2 SERPL-SCNC: 25 MMOL/L (ref 23–29)
CREAT SERPL-MCNC: 1.1 MG/DL (ref 0.5–1.4)
DIFFERENTIAL METHOD: ABNORMAL
EOSINOPHIL # BLD AUTO: 0.1 K/UL (ref 0–0.5)
EOSINOPHIL NFR BLD: 1.3 % (ref 0–8)
ERYTHROCYTE [DISTWIDTH] IN BLOOD BY AUTOMATED COUNT: 12.6 % (ref 11.5–14.5)
EST. GFR  (AFRICAN AMERICAN): >60 ML/MIN/1.73 M^2
EST. GFR  (NON AFRICAN AMERICAN): >60 ML/MIN/1.73 M^2
GLUCOSE SERPL-MCNC: 101 MG/DL (ref 70–110)
HCT VFR BLD AUTO: 40.1 % (ref 40–54)
HGB BLD-MCNC: 13.5 G/DL (ref 14–18)
IMM GRANULOCYTES # BLD AUTO: 0.03 K/UL (ref 0–0.04)
IMM GRANULOCYTES NFR BLD AUTO: 0.5 % (ref 0–0.5)
LYMPHOCYTES # BLD AUTO: 2.1 K/UL (ref 1–4.8)
LYMPHOCYTES NFR BLD: 33.2 % (ref 18–48)
MAGNESIUM SERPL-MCNC: 2.2 MG/DL (ref 1.6–2.6)
MCH RBC QN AUTO: 30.3 PG (ref 27–31)
MCHC RBC AUTO-ENTMCNC: 33.7 G/DL (ref 32–36)
MCV RBC AUTO: 90 FL (ref 82–98)
MONOCYTES # BLD AUTO: 0.7 K/UL (ref 0.3–1)
MONOCYTES NFR BLD: 10.3 % (ref 4–15)
NEUTROPHILS # BLD AUTO: 3.5 K/UL (ref 1.8–7.7)
NEUTROPHILS NFR BLD: 54.4 % (ref 38–73)
NRBC BLD-RTO: 0 /100 WBC
PHOSPHATE SERPL-MCNC: 4 MG/DL (ref 2.7–4.5)
PLATELET # BLD AUTO: 205 K/UL (ref 150–350)
PMV BLD AUTO: 8.7 FL (ref 9.2–12.9)
POTASSIUM SERPL-SCNC: 4.4 MMOL/L (ref 3.5–5.1)
PROT SERPL-MCNC: 7.6 G/DL (ref 6–8.4)
RBC # BLD AUTO: 4.46 M/UL (ref 4.6–6.2)
SODIUM SERPL-SCNC: 139 MMOL/L (ref 136–145)
WBC # BLD AUTO: 6.39 K/UL (ref 3.9–12.7)

## 2020-11-14 PROCEDURE — 80053 COMPREHEN METABOLIC PANEL: CPT

## 2020-11-14 PROCEDURE — 36415 COLL VENOUS BLD VENIPUNCTURE: CPT

## 2020-11-14 PROCEDURE — G0378 HOSPITAL OBSERVATION PER HR: HCPCS

## 2020-11-14 PROCEDURE — 96376 TX/PRO/DX INJ SAME DRUG ADON: CPT | Performed by: EMERGENCY MEDICINE

## 2020-11-14 PROCEDURE — 83735 ASSAY OF MAGNESIUM: CPT

## 2020-11-14 PROCEDURE — 99217 PR OBSERVATION CARE DISCHARGE: ICD-10-PCS | Mod: ,,, | Performed by: NURSE PRACTITIONER

## 2020-11-14 PROCEDURE — 25000003 PHARM REV CODE 250: Performed by: NURSE PRACTITIONER

## 2020-11-14 PROCEDURE — 85025 COMPLETE CBC W/AUTO DIFF WBC: CPT

## 2020-11-14 PROCEDURE — 86703 HIV-1/HIV-2 1 RESULT ANTBDY: CPT

## 2020-11-14 PROCEDURE — 99217 PR OBSERVATION CARE DISCHARGE: CPT | Mod: ,,, | Performed by: NURSE PRACTITIONER

## 2020-11-14 PROCEDURE — 99204 OFFICE O/P NEW MOD 45 MIN: CPT | Mod: ,,, | Performed by: PHYSICIAN ASSISTANT

## 2020-11-14 PROCEDURE — 86592 SYPHILIS TEST NON-TREP QUAL: CPT

## 2020-11-14 PROCEDURE — 99204 PR OFFICE/OUTPT VISIT, NEW, LEVL IV, 45-59 MIN: ICD-10-PCS | Mod: ,,, | Performed by: PHYSICIAN ASSISTANT

## 2020-11-14 PROCEDURE — 84100 ASSAY OF PHOSPHORUS: CPT

## 2020-11-14 RX ORDER — CLINDAMYCIN HYDROCHLORIDE 300 MG/1
600 CAPSULE ORAL EVERY 8 HOURS
Qty: 42 CAPSULE | Refills: 0 | Status: SHIPPED | OUTPATIENT
Start: 2020-11-14 | End: 2020-11-21

## 2020-11-14 RX ADMIN — CLINDAMYCIN IN 5 PERCENT DEXTROSE 600 MG: 12 INJECTION, SOLUTION INTRAVENOUS at 10:11

## 2020-11-14 RX ADMIN — IRBESARTAN 150 MG: 150 TABLET ORAL at 10:11

## 2020-11-14 RX ADMIN — CLINDAMYCIN IN 5 PERCENT DEXTROSE 600 MG: 12 INJECTION, SOLUTION INTRAVENOUS at 04:11

## 2020-11-14 RX ADMIN — Medication 1 CAPSULE: at 10:11

## 2020-11-14 NOTE — DISCHARGE SUMMARY
"[facility] Hospital Medicine  Discharge Summary      Patient Name: Matthew Phoenix  MRN: [id number]  Admission Date: 11/11/2020  Hospital Length of Stay: 0 days  Discharge Date and Time:  11/14/2020 3:45 PM  Attending Physician: Rupesh Farrell MD   Discharging Provider: Laurita Zimmer NP  Primary Care Provider: Jordi White MD  [facility] Medicine Team: [facility] MED F Laurita Zimmer NP    HPI:   51 y/o male who presented to the [facility] on 11-11-20 due to 2 day history of right leg pain and 1 day for fever, he was sent to ED to be further evaluated.  Patient has a lump in his right groin that is tender to touch, slight warmth. Patient states his whole leg hurts. Patient has some residual swelling in the right leg from accident in 1999. The patient reports that yesterday the pain began to increase causing him to begin walking with a limp and prompting him to go to Urgent Care.    He was seen by Ortho in the ED and multiple imaging studies done. Per their note "atraumatic, medial sided RLE pain x 2 days. The patient is having pain and TTP on the medial side of his RLE from his groin to his ankle which is worsened with ambulation, however he has no pain with R hip ROM, no pain with axial loading of his R hip, and no pain with R knee ROM. The patient was febrile (101.9) and tachycardic 110 on arrival, though his vitals are now stable. He has normal ESR, normal procal, elevated CRP (61.7), and mildly elevated WBC (13.7). MRI of pelvis did not show any significant hip effusion and his symptoms are not at all concerning for septic hip. His sx however do appear to be from an infectious etiology, and MRI was concerning for super-infected   right inguinal lymph node. This does not appear to be an operative orthopaedic issue"    Patient stared on Clindimycin IV.   Infectious w/u started with BC x2, UC, CRP 61.7  ESR 21 , Procalcitonin 0.19. lactic 1.6      * No surgery found *  " "    Hospital Course:   Placed in observation for R groin / lymph node infection.  Febrile with leukocytosis noted on admit.  He was started on clindamycin and responded well by defevervescing and resolution of his leukocytosis, in addition to decreased swelling and pain. Patient noted a small cut in his groin days prior with no open wounds, boils or ingrown hairs, he just felt like there had been a small cut and then days later the swelling was notable. CT of lower extremity ordered by Ortho; no evidence of infection, f/u Ortho in 1-2 weeks if pain worsens or does not improve.    LFT"s mildly elevated, RUQ US + hepatic steatosis, trending back down     Consulted infectious disease to discuss length of treatment with retained hardware and +/- IV abx. Po clinda 600mg po q8 x 10 day course end date 11/21/20    Dispo: home finish with abx, no evidence to suggest needing incision/ drainage     Consults:   Consults (From admission, onward)        Status Ordering Provider     Inpatient consult to Infectious Diseases  Once     Provider:  (Not yet assigned)    Completed SPENSER QUIROS     Inpatient consult to Orthopedic Surgery  Once     Provider:  (Not yet assigned)    Completed VIJAYA LAMB          * Enlarged lymph node  49 y/o male with 2 day history of right leg pain and swelling with lump in groin and 1 day of fever. Went to Urgent Care and sent to ED. US Felipe LR no DVT. Seen by Ortho and no septic joint, no infectious process of LE    - admit to OBS  - Urine negative  - infectious w/u in progress with BC x2 and UC still pending  - Clindimycin 600 mg IV Q6H  - tylenol for fever  - pain management  - micro pending at time of discharge    - f/u with Infectious dz      Elevated LFTs  - new onset elevation in LFT's      Right leg swelling  - acute on chronic edema      Pain and swelling of lower leg, right  Seen  per ortho  - Empiric iv abx  - WBAT to BLE  - No acute orthopaedic intervention at this time   - CT of " R tib fib  No evid infection in or near hardware       Essential hypertension  Continue home irbesartan, resume HCTZ     Final Active Diagnoses:    Diagnosis Date Noted POA    PRINCIPAL PROBLEM:  Enlarged lymph node [R59.9] 11/11/2020 Yes    Elevated LFTs [R79.89] 11/13/2020 Yes    Right leg swelling [M79.89]  Yes    Pain and swelling of lower leg, right [M79.661, M79.89] 11/11/2020 Yes    Essential hypertension [I10] 09/09/2019 Yes      Problems Resolved During this Admission:       Discharged Condition: good    Disposition: Home or Self Care    Follow Up:    Patient Instructions:      Ambulatory referral/consult to Infectious Disease   Standing Status: Future   Referral Priority: Routine Referral Type: Consultation   Referral Reason: Specialty Services Required   Referred to Provider: ALMA COTA Specialty: Infectious Diseases   Number of Visits Requested: 1     Diet Adult Regular     Notify your health care provider if you experience any of the following:  temperature >100.4     Notify your health care provider if you experience any of the following:  persistent nausea and vomiting or diarrhea     Notify your health care provider if you experience any of the following:  severe uncontrolled pain     Notify your health care provider if you experience any of the following:  redness, tenderness, or signs of infection (pain, swelling, redness, odor or green/yellow discharge around incision site)     Notify your health care provider if you experience any of the following:  difficulty breathing or increased cough     Notify your health care provider if you experience any of the following:  severe persistent headache     Notify your health care provider if you experience any of the following:  worsening rash     Notify your health care provider if you experience any of the following:  persistent dizziness, light-headedness, or visual disturbances     Notify your health care provider if you  experience any of the following:  increased confusion or weakness     Activity as tolerated       Significant Diagnostic Studies: Labs: All labs within the past 24 hours have been reviewed    Pending Diagnostic Studies:     Procedure Component Value Units Date/Time    HIV 1/2 Ag/Ab (4th Gen) [[id number]]     Order Status: Sent Lab Status: No result     Specimen: Blood     RPR [[id number]]     Order Status: Sent Lab Status: No result     Specimen: Blood          Medications:  Reconciled Home Medications:      Medication List      START taking these medications    clindamycin 300 MG capsule  Commonly known as: CLEOCIN  Take 2 capsules (600 mg total) by mouth every 8 (eight) hours. for 7 days     Lactobacillus rhamnosus GG 10 billion cell capsule  Commonly known as: CULTURELLE  Take 1 capsule by mouth 2 (two) times daily.        CONTINUE taking these medications    irbesartan-hydrochlorothiazide 150-12.5 mg per tablet  Commonly known as: AVALIDE  Take 1 tablet by mouth once daily.        STOP taking these medications    naproxen 500 MG tablet  Commonly known as: NAPROSYN     pantoprazole 40 MG tablet  Commonly known as: PROTONIX     triamcinolone acetonide 0.1% 0.1 % cream  Commonly known as: KENALOG            Indwelling Lines/Drains at time of discharge:   Lines/Drains/Airways     None                 Time spent on the discharge of patient: 55 minutes  Patient was seen and examined on the date of discharge and determined to be suitable for discharge.         Laurita Zimmer NP  Department of Hospital Medicine  [facility]

## 2020-11-14 NOTE — ASSESSMENT & PLAN NOTE
Seen  per ortho  - Empiric iv abx  - WBAT to BLE  - No acute orthopaedic intervention at this time   - CT of R tib fib  No evid infection in or near hardware

## 2020-11-14 NOTE — ASSESSMENT & PLAN NOTE
51 y/o male with 2 day history of right leg pain and swelling with lump in groin and 1 day of fever. Went to Urgent Care and sent to ED. US Felipe LR no DVT. Seen by Ortho and no septic joint, no infectious process of LE    - admit to OBS  - Urine negative  - infectious w/u in progress with BC x2 and UC still pending  - Clindimycin 600 mg IV Q6H  - tylenol for fever  - pain management  - micro pending at time of discharge    - f/u with Infectious dz

## 2020-11-14 NOTE — ASSESSMENT & PLAN NOTE
49 y/o male with 2 day history of right leg pain and swelling with lump in groin and 1 day of fever. Went to Urgent Care and sent to ED. US Felipe LR no DVT. Seen by Ortho and no septic joint, no infectious process of LE    - admit to OBS  - Urine negative  - infectious w/u in progress with BC x2 and UC still pending  - Clindimycin 600 mg IV Q6H  - tylenol for fever  - pain management  - micro pending

## 2020-11-14 NOTE — SUBJECTIVE & OBJECTIVE
Past Medical History:   Diagnosis Date    Hypertension        Past Surgical History:   Procedure Laterality Date    FRACTURE SURGERY Right 1999    [facility]; Right lower leg compound fracture repair       Review of patient's allergies indicates:   Allergen Reactions    Penicillins        Medications:  Medications Prior to Admission   Medication Sig    irbesartan-hydrochlorothiazide (AVALIDE) 150-12.5 mg per tablet Take 1 tablet by mouth once daily.    naproxen (NAPROSYN) 500 MG tablet Take 1 tablet (500 mg total) by mouth 2 (two) times daily with meals. (Patient not taking: Reported on 10/26/2020)    pantoprazole (PROTONIX) 40 MG tablet Take 1 tablet (40 mg total) by mouth once daily. (Patient not taking: Reported on 11/11/2020)    triamcinolone acetonide 0.1% (KENALOG) 0.1 % cream Apply topically 2 (two) times daily. (Patient not taking: Reported on 10/26/2020)     Antibiotics (From admission, onward)    None        Antifungals (From admission, onward)    None        Antivirals (From admission, onward)    None           Immunization History   Administered Date(s) Administered    Td (ADULT) 09/15/2005       Family History     Problem Relation (Age of Onset)    Diabetes Sister    Eczema Daughter    Throat cancer Father        Social History     Socioeconomic History    Marital status:      Spouse name: Not on file    Number of children: Not on file    Years of education: Not on file    Highest education level: Not on file   Occupational History    Not on file   Social Needs    Financial resource strain: Not on file    Food insecurity     Worry: Not on file     Inability: Not on file    Transportation needs     Medical: Not on file     Non-medical: Not on file   Tobacco Use    Smoking status: Never Smoker   Substance and Sexual Activity    Alcohol use: Yes    Drug use: Not on file    Sexual activity: Not on file   Lifestyle    Physical activity     Days per week: Not on file      Minutes per session: Not on file    Stress: Not on file   Relationships    Social connections     Talks on phone: Not on file     Gets together: Not on file     Attends Denominational service: Not on file     Active member of club or organization: Not on file     Attends meetings of clubs or organizations: Not on file     Relationship status: Not on file   Other Topics Concern    Not on file   Social History Narrative    Not on file     Review of Systems   Constitutional: Negative for activity change, appetite change, chills, fatigue and fever.   HENT: Negative for congestion and trouble swallowing.    Respiratory: Negative for cough, shortness of breath and wheezing.    Cardiovascular: Positive for leg swelling (decrease rle). Negative for chest pain and palpitations.   Gastrointestinal: Negative for abdominal pain, constipation, diarrhea, nausea and vomiting.   Genitourinary: Negative for decreased urine volume, difficulty urinating, dysuria, enuresis and hematuria.   Musculoskeletal: Positive for arthralgias and myalgias. Negative for back pain.   Skin: Negative for color change, rash and wound.   Neurological: Negative for dizziness, speech difficulty, weakness, light-headedness, numbness and headaches.   Hematological: Positive for adenopathy. Does not bruise/bleed easily.   Psychiatric/Behavioral: Negative for agitation, decreased concentration and sleep disturbance. The patient is not nervous/anxious.      Objective:     Vital Signs (Most Recent):  Temp: 98 °F (36.7 °C) (11/14/20 1103)  Pulse: 63 (11/14/20 1103)  Resp: 16 (11/14/20 1103)  BP: 136/83 (11/14/20 1103)  SpO2: 97 % (11/14/20 1103) Vital Signs (24h Range):  Temp:  [97.8 °F (36.6 °C)-99.3 °F (37.4 °C)] 98 °F (36.7 °C)  Pulse:  [61-77] 63  Resp:  [12-20] 16  SpO2:  [93 %-99 %] 97 %  BP: (126-149)/(79-86) 136/83     Weight: 97.7 kg (215 lb 6.2 oz)  Body mass index is 31.81 kg/m².    Estimated Creatinine Clearance: 91.6 mL/min (based on SCr of 1.1  mg/dL).    Physical Exam  Vitals signs and nursing note reviewed.   Constitutional:       General: He is not in acute distress.     Appearance: Normal appearance. He is well-developed and normal weight. He is not diaphoretic.   HENT:      Head: Normocephalic and atraumatic.      Right Ear: External ear normal.      Left Ear: External ear normal.      Nose: Nose normal.      Mouth/Throat:      Mouth: Mucous membranes are moist.      Pharynx: Oropharynx is clear.   Eyes:      Extraocular Movements: Extraocular movements intact.      Conjunctiva/sclera: Conjunctivae normal.   Neck:      Musculoskeletal: Normal range of motion and neck supple.      Vascular: No JVD.   Cardiovascular:      Rate and Rhythm: Normal rate and regular rhythm.      Pulses: Normal pulses.      Heart sounds: Normal heart sounds. No murmur.   Pulmonary:      Effort: Pulmonary effort is normal. No respiratory distress.      Breath sounds: Normal breath sounds. No wheezing or rales.   Abdominal:      General: Abdomen is flat. Bowel sounds are normal. There is no distension.      Palpations: Abdomen is soft. There is no mass.      Tenderness: There is no abdominal tenderness. There is no guarding.   Genitourinary:     Penis: Normal.    Musculoskeletal: Normal range of motion.         General: Swelling and tenderness present.      Right lower leg: Edema (chronic) present.      Comments: Decreased swelling and tenderness.    R inguinal node palpated but non tender.     Skin:     General: Skin is warm and dry.      Findings: No erythema or rash.      Comments: Feet- dry skin  Eczema R leg   Neurological:      General: No focal deficit present.      Mental Status: He is alert and oriented to person, place, and time.      Cranial Nerves: No cranial nerve deficit.      Sensory: No sensory deficit.      Coordination: Coordination normal.   Psychiatric:         Mood and Affect: Mood normal.         Behavior: Behavior normal.         Thought Content: Thought  content normal.         Judgment: Judgment normal.         Significant Labs: All pertinent labs within the past 24 hours have been reviewed.    Significant Imaging: I have reviewed all pertinent imaging results/findings within the past 24 hours.

## 2020-11-14 NOTE — ASSESSMENT & PLAN NOTE
51 y/o male w/ prior hx of open pilon fracture who underwent ex fix 1999 who presented w  right leg swelling, pain, and associated lump in his right groin that was tender to touch.  Pain started near groin area and gradually worsened down leg.  Patient noted possible skin opening from scratching area week prior, but not visible on exam. 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  Pt was febrile, tachy, w/ elevated CRP and wbc. Pt denied any exposures.     MRI done showed enhancing 5.6 x 1.6 cm T2 hyperintense/T1 isointense mass within the subcutaneous tissue of the right medial thigh and additional smaller lesions in the bilateral inguinal regions.  Stranding adjacent to the dominant lesion in the right inguinal region.  The findings may represent superinfected right-sided inguinal lymph node.  No definitive abscess.      Pt was seen by Ortho-initially concerned for hardware infection. Ct done of lower extremity though w/ No convincing bony destruction or abscess  Patient stared on Clindimycin IV w/ resolution of his leukocytosis and F as well as great improvement in symptoms.          Plan  -Recommend continue Clindamycin transition to  mg q8h for total of 10 day course (est end date 11/21/20).  - Source unclear, but quickly improved on Clindamycin  -Will order HIV and RPR  - Low suscpicion for involvement of hardware at this time given imaging, but will monitor pt w/ ID f/u.  - ID f/u in 10 days.  -Pt case and plan discussed with MARISOL Zimmer and ID staff.  -ID will sign off.

## 2020-11-14 NOTE — HPI
51 y/o male w/ prior hx of open pilon fracture who underwent ex fix 1999 who presented to the [facility] on 11-11-20 due to 2 day history of right leg pain and 1 day for fever, he was sent to ED to be further evaluated.  Patient had a lump in his right groin that is tender to touch, slight warmth w/ associated tenderness throughout entire R leg.  In ED pt was febrile, tachy w/ elevated CRP and WBX.  MRI done showed enhancing 5.6 x 1.6 cm T2 hyperintense/T1 isointense mass within the subcutaneous tissue of the right medial thigh and additional smaller lesions in the bilateral inguinal regions.  Stranding adjacent to the dominant lesion in the right inguinal region.  The findings may represent superinfected right-sided inguinal lymph node.  No definitive abscess.      He was seen by Ortho in the ED initially concerned for hardware infection. Ct done of lower extremity though w/ No convincing bony destruction or abscess  Patient stared on Clindimycin IV w/ resolution of his leukocytosis and F.  Patient noted possilbe skin opening from scratching area week prior.  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 ID consulted to discuss length of treatment with retained hardware and +/- IV abx.

## 2020-11-14 NOTE — CONSULTS
[facility]  Infectious Disease  Consult Note    Patient Name: Matthew Phoenix  MRN: [id number]  Admission Date: 11/11/2020  Hospital Length of Stay: 0 days  Attending Physician: Rupesh Farrell MD  Primary Care Provider: Jordi White MD     Isolation Status: No active isolations    Patient information was obtained from patient, past medical records and ER records.      Inpatient consult to Infectious Diseases  Consult performed by: Prince Akers PA-C  Consult ordered by: Laurita Zimmer NP        Assessment/Plan:     * Enlarged lymph node  51 y/o male w/ prior hx of open pilon fracture who underwent ex fix 1999 who presented w  right leg swelling, pain, and associated lump in his right groin that was tender to touch.  Pain started near groin area and gradually worsened down leg.  Patient noted possible skin opening from scratching area week prior, but not visible on exam. 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  Pt was febrile, tachy, w/ elevated CRP and wbc. Pt denied any exposures.     MRI done showed enhancing 5.6 x 1.6 cm T2 hyperintense/T1 isointense mass within the subcutaneous tissue of the right medial thigh and additional smaller lesions in the bilateral inguinal regions.  Stranding adjacent to the dominant lesion in the right inguinal region.  The findings may represent superinfected right-sided inguinal lymph node.  No definitive abscess.      Pt was seen by Ortho-initially concerned for hardware infection. Ct done of lower extremity though w/ No convincing bony destruction or abscess  Patient stared on Clindimycin IV w/ resolution of his leukocytosis and F as well as great improvement in symptoms.          Plan  -Recommend continue Clindamycin transition to  mg q8h for total of 10 day course (est end date 11/21/20).  - Source unclear, but quickly improved on Clindamycin  -Will order HIV and  RPR  - Low suscpicion for involvement of hardware at this time given imaging, but will monitor pt w/ ID f/u.  - ID f/u in 10 days.  -Pt case and plan discussed with MARISOL Zimmer and ID staff.  -ID will sign off.    Pain and swelling of lower leg, right  See above      Thank you for your consult. I will sign off. Please contact us if you have any additional questions.    Prince Akers PA-C  Infectious Disease  [facility]    Subjective:     Principal Problem: Enlarged lymph node    HPI: 49 y/o male w/ prior hx of open pilon fracture who underwent ex fix 1999 who presented to the [facility] on 11-11-20 due to 2 day history of right leg pain and 1 day for fever, he was sent to ED to be further evaluated.  Patient had a lump in his right groin that is tender to touch, slight warmth w/ associated tenderness throughout entire R leg.  In ED pt was febrile, tachy w/ elevated CRP and WBX.  MRI done showed enhancing 5.6 x 1.6 cm T2 hyperintense/T1 isointense mass within the subcutaneous tissue of the right medial thigh and additional smaller lesions in the bilateral inguinal regions.  Stranding adjacent to the dominant lesion in the right inguinal region.  The findings may represent superinfected right-sided inguinal lymph node.  No definitive abscess.      He was seen by Ortho in the ED initially concerned for hardware infection. Ct done of lower extremity though w/ No convincing bony destruction or abscess  Patient stared on Clindimycin IV w/ resolution of his leukocytosis and F.  Patient noted possilbe skin opening from scratching area week prior.  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 ID consulted to discuss length of treatment with retained hardware and +/- IV abx.           Past Medical History:   Diagnosis Date    Hypertension        Past Surgical History:   Procedure Laterality Date    FRACTURE SURGERY Right 1999     [facility]; Right lower leg compound fracture repair       Review of patient's allergies indicates:   Allergen Reactions    Penicillins        Medications:  Medications Prior to Admission   Medication Sig    irbesartan-hydrochlorothiazide (AVALIDE) 150-12.5 mg per tablet Take 1 tablet by mouth once daily.    naproxen (NAPROSYN) 500 MG tablet Take 1 tablet (500 mg total) by mouth 2 (two) times daily with meals. (Patient not taking: Reported on 10/26/2020)    pantoprazole (PROTONIX) 40 MG tablet Take 1 tablet (40 mg total) by mouth once daily. (Patient not taking: Reported on 11/11/2020)    triamcinolone acetonide 0.1% (KENALOG) 0.1 % cream Apply topically 2 (two) times daily. (Patient not taking: Reported on 10/26/2020)     Antibiotics (From admission, onward)    None        Antifungals (From admission, onward)    None        Antivirals (From admission, onward)    None           Immunization History   Administered Date(s) Administered    Td (ADULT) 09/15/2005       Family History     Problem Relation (Age of Onset)    Diabetes Sister    Eczema Daughter    Throat cancer Father        Social History     Socioeconomic History    Marital status:      Spouse name: Not on file    Number of children: Not on file    Years of education: Not on file    Highest education level: Not on file   Occupational History    Not on file   Social Needs    Financial resource strain: Not on file    Food insecurity     Worry: Not on file     Inability: Not on file    Transportation needs     Medical: Not on file     Non-medical: Not on file   Tobacco Use    Smoking status: Never Smoker   Substance and Sexual Activity    Alcohol use: Yes    Drug use: Not on file    Sexual activity: Not on file   Lifestyle    Physical activity     Days per week: Not on file     Minutes per session: Not on file    Stress: Not on file   Relationships    Social connections     Talks on phone: Not on file     Gets together: Not  on file     Attends Mosque service: Not on file     Active member of club or organization: Not on file     Attends meetings of clubs or organizations: Not on file     Relationship status: Not on file   Other Topics Concern    Not on file   Social History Narrative    Not on file     Review of Systems   Constitutional: Negative for activity change, appetite change, chills, fatigue and fever.   HENT: Negative for congestion and trouble swallowing.    Respiratory: Negative for cough, shortness of breath and wheezing.    Cardiovascular: Positive for leg swelling (decrease rle). Negative for chest pain and palpitations.   Gastrointestinal: Negative for abdominal pain, constipation, diarrhea, nausea and vomiting.   Genitourinary: Negative for decreased urine volume, difficulty urinating, dysuria, enuresis and hematuria.   Musculoskeletal: Positive for arthralgias and myalgias. Negative for back pain.   Skin: Negative for color change, rash and wound.   Neurological: Negative for dizziness, speech difficulty, weakness, light-headedness, numbness and headaches.   Hematological: Positive for adenopathy. Does not bruise/bleed easily.   Psychiatric/Behavioral: Negative for agitation, decreased concentration and sleep disturbance. The patient is not nervous/anxious.      Objective:     Vital Signs (Most Recent):  Temp: 98 °F (36.7 °C) (11/14/20 1103)  Pulse: 63 (11/14/20 1103)  Resp: 16 (11/14/20 1103)  BP: 136/83 (11/14/20 1103)  SpO2: 97 % (11/14/20 1103) Vital Signs (24h Range):  Temp:  [97.8 °F (36.6 °C)-99.3 °F (37.4 °C)] 98 °F (36.7 °C)  Pulse:  [61-77] 63  Resp:  [12-20] 16  SpO2:  [93 %-99 %] 97 %  BP: (126-149)/(79-86) 136/83     Weight: 97.7 kg (215 lb 6.2 oz)  Body mass index is 31.81 kg/m².    Estimated Creatinine Clearance: 91.6 mL/min (based on SCr of 1.1 mg/dL).    Physical Exam  Vitals signs and nursing note reviewed.   Constitutional:       General: He is not in acute distress.     Appearance: Normal  appearance. He is well-developed and normal weight. He is not diaphoretic.   HENT:      Head: Normocephalic and atraumatic.      Right Ear: External ear normal.      Left Ear: External ear normal.      Nose: Nose normal.      Mouth/Throat:      Mouth: Mucous membranes are moist.      Pharynx: Oropharynx is clear.   Eyes:      Extraocular Movements: Extraocular movements intact.      Conjunctiva/sclera: Conjunctivae normal.   Neck:      Musculoskeletal: Normal range of motion and neck supple.      Vascular: No JVD.   Cardiovascular:      Rate and Rhythm: Normal rate and regular rhythm.      Pulses: Normal pulses.      Heart sounds: Normal heart sounds. No murmur.   Pulmonary:      Effort: Pulmonary effort is normal. No respiratory distress.      Breath sounds: Normal breath sounds. No wheezing or rales.   Abdominal:      General: Abdomen is flat. Bowel sounds are normal. There is no distension.      Palpations: Abdomen is soft. There is no mass.      Tenderness: There is no abdominal tenderness. There is no guarding.   Genitourinary:     Penis: Normal.    Musculoskeletal: Normal range of motion.         General: Swelling and tenderness present.      Right lower leg: Edema (chronic) present.      Comments: Decreased swelling and tenderness.    R inguinal node palpated but non tender.     Skin:     General: Skin is warm and dry.      Findings: No erythema or rash.      Comments: Feet- dry skin  Eczema R leg   Neurological:      General: No focal deficit present.      Mental Status: He is alert and oriented to person, place, and time.      Cranial Nerves: No cranial nerve deficit.      Sensory: No sensory deficit.      Coordination: Coordination normal.   Psychiatric:         Mood and Affect: Mood normal.         Behavior: Behavior normal.         Thought Content: Thought content normal.         Judgment: Judgment normal.         Significant Labs: All pertinent labs within the past 24 hours have been  reviewed.    Significant Imaging: I have reviewed all pertinent imaging results/findings within the past 24 hours.

## 2020-11-14 NOTE — SUBJECTIVE & OBJECTIVE
Interval History: Patient continues to improve/ swelling improving, decreased pain to right thigh/ groin area     Review of Systems   Constitutional: Positive for activity change. Negative for appetite change, chills, fatigue and fever.   HENT: Negative for congestion and trouble swallowing.    Respiratory: Negative for cough, shortness of breath and wheezing.    Cardiovascular: Positive for leg swelling (rle). Negative for chest pain and palpitations.   Gastrointestinal: Negative for abdominal pain, constipation, diarrhea, nausea and vomiting.   Genitourinary: Negative for decreased urine volume, difficulty urinating, dysuria, enuresis and hematuria.   Musculoskeletal: Positive for arthralgias and myalgias. Negative for back pain.   Skin: Negative for color change, rash and wound.   Neurological: Negative for dizziness, speech difficulty, weakness, light-headedness, numbness and headaches.   Hematological: Positive for adenopathy. Does not bruise/bleed easily.   Psychiatric/Behavioral: Negative for agitation, decreased concentration and sleep disturbance. The patient is not nervous/anxious.      Objective:     Vital Signs (Most Recent):  Temp: 99.3 °F (37.4 °C) (11/13/20 2041)  Pulse: 77 (11/13/20 2041)  Resp: 18 (11/13/20 2041)  BP: 129/81 (11/13/20 2041)  SpO2: 98 % (11/13/20 2041) Vital Signs (24h Range):  Temp:  [96.4 °F (35.8 °C)-99.7 °F (37.6 °C)] 99.3 °F (37.4 °C)  Pulse:  [75-87] 77  Resp:  [15-18] 18  SpO2:  [94 %-99 %] 98 %  BP: (117-133)/(65-92) 129/81     Weight: 97.7 kg (215 lb 6.2 oz)  Body mass index is 31.81 kg/m².    Intake/Output Summary (Last 24 hours) at 11/13/2020 2256  Last data filed at 11/13/2020 0600  Gross per 24 hour   Intake 750 ml   Output 950 ml   Net -200 ml      Physical Exam  Vitals signs and nursing note reviewed.   Constitutional:       General: He is not in acute distress.     Appearance: Normal appearance. He is well-developed and normal weight. He is not diaphoretic.   HENT:       Head: Normocephalic and atraumatic.      Right Ear: External ear normal.      Left Ear: External ear normal.      Nose: Nose normal.      Mouth/Throat:      Mouth: Mucous membranes are moist.      Pharynx: Oropharynx is clear.   Eyes:      Extraocular Movements: Extraocular movements intact.      Conjunctiva/sclera: Conjunctivae normal.   Neck:      Musculoskeletal: Normal range of motion and neck supple.      Vascular: No JVD.   Cardiovascular:      Rate and Rhythm: Normal rate and regular rhythm.      Pulses: Normal pulses.      Heart sounds: Normal heart sounds. No murmur.   Pulmonary:      Effort: Pulmonary effort is normal. No respiratory distress.      Breath sounds: Normal breath sounds. No wheezing or rales.   Abdominal:      General: Abdomen is flat. Bowel sounds are normal. There is no distension.      Palpations: Abdomen is soft. There is no mass.      Tenderness: There is no abdominal tenderness. There is no guarding.   Genitourinary:     Penis: Normal.    Musculoskeletal: Normal range of motion.         General: Swelling and tenderness present.      Right lower leg: Edema (chronic) present.   Skin:     General: Skin is warm and dry.      Findings: No erythema or rash.   Neurological:      General: No focal deficit present.      Mental Status: He is alert and oriented to person, place, and time.      Cranial Nerves: No cranial nerve deficit.      Sensory: No sensory deficit.      Coordination: Coordination normal.   Psychiatric:         Mood and Affect: Mood normal.         Behavior: Behavior normal.         Thought Content: Thought content normal.         Judgment: Judgment normal.         Significant Labs:   CBC:   Recent Labs   Lab 11/12/20 0439 11/13/20  0553   WBC 10.61 7.29   HGB 14.0 12.7*   HCT 42.2 37.9*    173     CMP:   Recent Labs   Lab 11/12/20 0439 11/13/20  0553    136   K 4.0 3.8    105   CO2 23 22*   GLU 82 132*   BUN 15 11   CREATININE 1.1 1.0   CALCIUM 8.7 8.6*    PROT 7.3 6.9   ALBUMIN 3.6 3.3*   BILITOT 1.3* 0.6   ALKPHOS 78 117   AST 27 86*   ALT 29 93*   ANIONGAP 13 9   EGFRNONAA >60.0 >60.0       Significant Imaging: I have reviewed all pertinent imaging results/findings within the past 24 hours.

## 2020-11-14 NOTE — PROGRESS NOTES
"[facility] Medicine  Progress Note    Patient Name: Matthew Phoenix  MRN: [id number]  Patient Class: OP- Observation   Admission Date: 11/11/2020  Length of Stay: 0 days  Attending Physician: Rupesh Farrell MD  Primary Care Provider: Jordi White MD    [facility] Medicine Team: [facility] MED  Laurita Zimmer NP    Subjective:     Principal Problem:Enlarged lymph node        HPI:  49 y/o male who presented to the [facility] on 11-11-20 due to 2 day history of right leg pain and 1 day for fever, he was sent to ED to be further evaluated.  Patient has a lump in his right groin that is tender to touch, slight warmth. Patient states his whole leg hurts. Patient has some residual swelling in the right leg from accident in 1999. The patient reports that yesterday the pain began to increase causing him to begin walking with a limp and prompting him to go to Urgent Care.    He was seen by Ortho in the ED and multiple imaging studies done. Per their note "atraumatic, medial sided RLE pain x 2 days. The patient is having pain and TTP on the medial side of his RLE from his groin to his ankle which is worsened with ambulation, however he has no pain with R hip ROM, no pain with axial loading of his R hip, and no pain with R knee ROM. The patient was febrile (101.9) and tachycardic 110 on arrival, though his vitals are now stable. He has normal ESR, normal procal, elevated CRP (61.7), and mildly elevated WBC (13.7). MRI of pelvis did not show any significant hip effusion and his symptoms are not at all concerning for septic hip. His sx however do appear to be from an infectious etiology, and MRI was concerning for super-infected   right inguinal lymph node. This does not appear to be an operative orthopaedic issue"    Patient stared on Clindimycin IV.   Infectious w/u started with BC x2, UC, CRP 61.7  ESR 21 , Procalcitonin 0.19. lactic 1.6      Overview/Hospital Course:  Placed in " observation for R groin / lymph node infection.  Febrile with leukocytosis noted on admit.  He was started in clindamycin and has responded well by defevervescing and resolution of his leukocytosis, in addition to decreasing swelling and pain noted. Patient noted a small cut in his groin days prior with no open wounds, boils or ingrown hairs, he just felt like there had been a small cut and then days later the swelling was notable. CT of lower extremity ordered by Ortho; no evidence of infection, f/u Ortho in 1-2 weeks if pain worsens or does not improve.    Consulted infectious disease to discuss length of treatment with retained hardware and +/- IV abx.     Dispo: home in 1-2 days to finish with abx, no evidence to suggest needing incision/ drainage    Interval History: Patient continues to improve/ swelling improving, decreased pain to right thigh/ groin area     Review of Systems   Constitutional: Positive for activity change. Negative for appetite change, chills, fatigue and fever.   HENT: Negative for congestion and trouble swallowing.    Respiratory: Negative for cough, shortness of breath and wheezing.    Cardiovascular: Positive for leg swelling (rle). Negative for chest pain and palpitations.   Gastrointestinal: Negative for abdominal pain, constipation, diarrhea, nausea and vomiting.   Genitourinary: Negative for decreased urine volume, difficulty urinating, dysuria, enuresis and hematuria.   Musculoskeletal: Positive for arthralgias and myalgias. Negative for back pain.   Skin: Negative for color change, rash and wound.   Neurological: Negative for dizziness, speech difficulty, weakness, light-headedness, numbness and headaches.   Hematological: Positive for adenopathy. Does not bruise/bleed easily.   Psychiatric/Behavioral: Negative for agitation, decreased concentration and sleep disturbance. The patient is not nervous/anxious.      Objective:     Vital Signs (Most Recent):  Temp: 99.3 °F (37.4 °C)  (11/13/20 2041)  Pulse: 77 (11/13/20 2041)  Resp: 18 (11/13/20 2041)  BP: 129/81 (11/13/20 2041)  SpO2: 98 % (11/13/20 2041) Vital Signs (24h Range):  Temp:  [96.4 °F (35.8 °C)-99.7 °F (37.6 °C)] 99.3 °F (37.4 °C)  Pulse:  [75-87] 77  Resp:  [15-18] 18  SpO2:  [94 %-99 %] 98 %  BP: (117-133)/(65-92) 129/81     Weight: 97.7 kg (215 lb 6.2 oz)  Body mass index is 31.81 kg/m².    Intake/Output Summary (Last 24 hours) at 11/13/2020 2256  Last data filed at 11/13/2020 0600  Gross per 24 hour   Intake 750 ml   Output 950 ml   Net -200 ml      Physical Exam  Vitals signs and nursing note reviewed.   Constitutional:       General: He is not in acute distress.     Appearance: Normal appearance. He is well-developed and normal weight. He is not diaphoretic.   HENT:      Head: Normocephalic and atraumatic.      Right Ear: External ear normal.      Left Ear: External ear normal.      Nose: Nose normal.      Mouth/Throat:      Mouth: Mucous membranes are moist.      Pharynx: Oropharynx is clear.   Eyes:      Extraocular Movements: Extraocular movements intact.      Conjunctiva/sclera: Conjunctivae normal.   Neck:      Musculoskeletal: Normal range of motion and neck supple.      Vascular: No JVD.   Cardiovascular:      Rate and Rhythm: Normal rate and regular rhythm.      Pulses: Normal pulses.      Heart sounds: Normal heart sounds. No murmur.   Pulmonary:      Effort: Pulmonary effort is normal. No respiratory distress.      Breath sounds: Normal breath sounds. No wheezing or rales.   Abdominal:      General: Abdomen is flat. Bowel sounds are normal. There is no distension.      Palpations: Abdomen is soft. There is no mass.      Tenderness: There is no abdominal tenderness. There is no guarding.   Genitourinary:     Penis: Normal.    Musculoskeletal: Normal range of motion.         General: Swelling and tenderness present.      Right lower leg: Edema (chronic) present.   Skin:     General: Skin is warm and dry.      Findings:  No erythema or rash.   Neurological:      General: No focal deficit present.      Mental Status: He is alert and oriented to person, place, and time.      Cranial Nerves: No cranial nerve deficit.      Sensory: No sensory deficit.      Coordination: Coordination normal.   Psychiatric:         Mood and Affect: Mood normal.         Behavior: Behavior normal.         Thought Content: Thought content normal.         Judgment: Judgment normal.         Significant Labs:   CBC:   Recent Labs   Lab 11/12/20 0439 11/13/20  0553   WBC 10.61 7.29   HGB 14.0 12.7*   HCT 42.2 37.9*    173     CMP:   Recent Labs   Lab 11/12/20 0439 11/13/20  0553    136   K 4.0 3.8    105   CO2 23 22*   GLU 82 132*   BUN 15 11   CREATININE 1.1 1.0   CALCIUM 8.7 8.6*   PROT 7.3 6.9   ALBUMIN 3.6 3.3*   BILITOT 1.3* 0.6   ALKPHOS 78 117   AST 27 86*   ALT 29 93*   ANIONGAP 13 9   EGFRNONAA >60.0 >60.0       Significant Imaging: I have reviewed all pertinent imaging results/findings within the past 24 hours.      Assessment/Plan:      * Enlarged lymph node  49 y/o male with 2 day history of right leg pain and swelling with lump in groin and 1 day of fever. Went to Urgent Care and sent to ED. US Felipe LR no DVT. Seen by Ortho and no septic joint, no infectious process of LE    - admit to OBS  - Urine negative  - infectious w/u in progress with BC x2 and UC still pending  - Clindimycin 600 mg IV Q6H  - tylenol for fever  - pain management  - micro pending      Elevated LFTs  - new onset elevation in LFT's      Right leg swelling  - acute on chronic edema      Pain and swelling of lower leg, right  Seen  per ortho  -Recommend admission to medicine for IV abx and further workup for infectious vs. Inflammatory vs. Oncologic etiology for his inguinal mass  -Empiric iv abx  -WBAT to BLE  -No acute orthopaedic intervention at this time       Hyperlipidemia, mixed        Essential hypertension  Continue home irbesartan, hold HCTZ for  now      VTE Risk Mitigation (From admission, onward)         Ordered     enoxaparin injection 40 mg  Every 24 hours      11/11/20 2316     IP VTE HIGH RISK PATIENT  Once      11/11/20 2316     Place sequential compression device  Until discontinued      11/11/20 2316                Discharge Planning   MANDY: 11/14/2020     Code Status: Full Code   Is the patient medically ready for discharge?: No    Reason for patient still in hospital (select all that apply): Patient new problem, Patient trending condition and Treatment  Discharge Plan A: Home with family                  Laurita Zimmer NP  Department of Hospital Medicine   [facility]

## 2020-11-16 LAB — HIV 1+2 AB+HIV1 P24 AG SERPL QL IA: NEGATIVE

## 2020-11-17 LAB — RPR SER QL: NORMAL

## 2020-11-20 ENCOUNTER — OFFICE VISIT (OUTPATIENT)
Dept: INFECTIOUS DISEASES | Facility: CLINIC | Age: 50
End: 2020-11-20
Payer: MEDICAID

## 2020-11-20 VITALS
HEART RATE: 74 BPM | SYSTOLIC BLOOD PRESSURE: 140 MMHG | WEIGHT: 211.44 LBS | TEMPERATURE: 99 F | BODY MASS INDEX: 31.32 KG/M2 | HEIGHT: 69 IN | DIASTOLIC BLOOD PRESSURE: 90 MMHG

## 2020-11-20 DIAGNOSIS — R59.9 ENLARGED LYMPH NODE: Primary | ICD-10-CM

## 2020-11-20 PROCEDURE — 99213 PR OFFICE/OUTPT VISIT, EST, LEVL III, 20-29 MIN: ICD-10-PCS | Mod: S$PBB,,, | Performed by: PHYSICIAN ASSISTANT

## 2020-11-20 PROCEDURE — 99999 PR PBB SHADOW E&M-EST. PATIENT-LVL III: CPT | Mod: PBBFAC,,, | Performed by: PHYSICIAN ASSISTANT

## 2020-11-20 PROCEDURE — 99999 PR PBB SHADOW E&M-EST. PATIENT-LVL III: ICD-10-PCS | Mod: PBBFAC,,, | Performed by: PHYSICIAN ASSISTANT

## 2020-11-20 PROCEDURE — 99213 OFFICE O/P EST LOW 20 MIN: CPT | Mod: S$PBB,,, | Performed by: PHYSICIAN ASSISTANT

## 2020-11-20 PROCEDURE — 99213 OFFICE O/P EST LOW 20 MIN: CPT | Mod: PBBFAC | Performed by: PHYSICIAN ASSISTANT

## 2020-11-20 NOTE — PROGRESS NOTES
Subjective:      Patient ID: Matthew Phoenix is a 50 y.o. male.    Chief Complaint:Follow-up      History of Present Illness    49 y/o male w/ prior hx of open pilon fracture who underwent ex fix 1999 who presented w  right leg swelling, pain, and associated lump in his right groin that was tender to touch.  Pain started near groin area and gradually worsened down leg.  Patient noted possible skin opening from scratching area week prior, but not visible on exam. 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  Pt was febrile, tachy, w/ elevated CRP and wbc. Pt denied any exposures.      MRI done showed enhancing 5.6 x 1.6 cm T2 hyperintense/T1 isointense mass within the subcutaneous tissue of the right medial thigh and additional smaller lesions in the bilateral inguinal regions.  Stranding adjacent to the dominant lesion in the right inguinal region.  The findings may represent superinfected right-sided inguinal lymph node.  No definitive abscess.      Pt was seen by Ortho-initially concerned for hardware infection. Ct done of lower extremity though w/ No convincing bony destruction or abscess  Patient stared on Clindimycin IV w/ resolution of his leukocytosis and F as well as great improvement in symptoms.       Final Plan for DC:  -Clindamycin transition to  mg q8h for total of 10 day course (est end date 11/21/20).  - Source unclear, but quickly improved on Clindamycin      - FU HIV and RPR testing    Patient reports doing well.  He is tolerating clindamycin but with mushy stool 4-5xd, no rose mary diarrhea.  Lymph node swelling completely resolved.  The patient denies any recent fever, chills, or sweats.      Review of Systems   Constitution: Negative for chills, decreased appetite, fever, malaise/fatigue, night sweats, weight gain and weight loss.   HENT: Negative for congestion, ear pain, hearing loss, hoarse voice, sore throat and tinnitus.    Eyes:  Negative for blurred vision, redness and visual disturbance.   Cardiovascular: Positive for leg swelling. Negative for chest pain and palpitations.   Respiratory: Negative for cough, hemoptysis, shortness of breath, sputum production and wheezing.    Endocrine: Negative for cold intolerance and heat intolerance.   Hematologic/Lymphatic: Positive for adenopathy. Does not bruise/bleed easily.   Skin: Negative for dry skin, itching, rash and suspicious lesions.   Musculoskeletal: Positive for myalgias (rle). Negative for back pain, joint pain and neck pain.   Gastrointestinal: Negative for abdominal pain, constipation, diarrhea, heartburn, nausea and vomiting.   Genitourinary: Negative for dysuria, flank pain, frequency, hematuria, hesitancy and urgency.   Neurological: Negative for dizziness, headaches, numbness, paresthesias and weakness.   Psychiatric/Behavioral: Negative for depression and memory loss. The patient does not have insomnia and is not nervous/anxious.    Allergic/Immunologic: Negative for environmental allergies, HIV exposure, hives and persistent infections.     Objective:   Physical Exam  Constitutional:       General: He is not in acute distress.     Appearance: He is well-developed. He is not diaphoretic.       HENT:      Head: Normocephalic and atraumatic.   Cardiovascular:      Rate and Rhythm: Normal rate and regular rhythm.      Heart sounds: Normal heart sounds. No murmur. No friction rub. No gallop.    Pulmonary:      Effort: Pulmonary effort is normal. No respiratory distress.      Breath sounds: Normal breath sounds. No wheezing or rales.   Abdominal:      General: Bowel sounds are normal. There is no distension.      Palpations: Abdomen is soft. There is no mass.      Tenderness: There is no abdominal tenderness. There is no guarding or rebound.   Lymphadenopathy:      Head:      Right side of head: No submental, submandibular, tonsillar, preauricular, posterior auricular or occipital  adenopathy.      Left side of head: No submental, submandibular, tonsillar, preauricular, posterior auricular or occipital adenopathy.      Cervical: No cervical adenopathy.      Right cervical: No superficial, deep or posterior cervical adenopathy.     Left cervical: No superficial, deep or posterior cervical adenopathy.      Upper Body:      Right upper body: No supraclavicular, axillary, pectoral or epitrochlear adenopathy.      Left upper body: No supraclavicular, axillary, pectoral or epitrochlear adenopathy.      Lower Body: No right inguinal adenopathy. No left inguinal adenopathy.   Skin:     General: Skin is warm and dry.   Neurological:      Mental Status: He is alert and oriented to person, place, and time.   Psychiatric:         Behavior: Behavior normal.       Assessment:       1. Enlarged lymph node        Suspect R groin cellulitis and reactive LN secondary to scratch in that area  Resolved on clindamycin and 14d of IV/po abx  Plan:   1. Complete clindamycin then monitor for any return of symptoms and contact me for me for any  2. Continue Culturelle and add FOS/INulin for gut health  3. FU PRN  4.  The patient was encouraged to call the office for further concerns or complaints.

## 2021-02-23 ENCOUNTER — TELEPHONE (OUTPATIENT)
Dept: FAMILY MEDICINE | Facility: CLINIC | Age: 51
End: 2021-02-23

## 2021-02-23 ENCOUNTER — PATIENT MESSAGE (OUTPATIENT)
Dept: PRIMARY CARE CLINIC | Facility: CLINIC | Age: 51
End: 2021-02-23

## 2021-03-05 ENCOUNTER — IMMUNIZATION (OUTPATIENT)
Dept: PRIMARY CARE CLINIC | Facility: CLINIC | Age: 51
End: 2021-03-05

## 2021-03-05 DIAGNOSIS — Z23 NEED FOR VACCINATION: Primary | ICD-10-CM

## 2021-03-05 PROCEDURE — 91303 PR SARSCOV2 VAC AD26 .5ML IM: ICD-10-PCS | Mod: S$GLB,,, | Performed by: INTERNAL MEDICINE

## 2021-03-05 PROCEDURE — 0031A PR IMMUNIZ ADMIN, SARS-COV-2 COVID-19 VACC, 5X10VP/0.5ML: ICD-10-PCS | Mod: CV19,S$GLB,, | Performed by: INTERNAL MEDICINE

## 2021-03-05 PROCEDURE — 91303 PR SARSCOV2 VAC AD26 .5ML IM: CPT | Mod: S$GLB,,, | Performed by: INTERNAL MEDICINE

## 2021-03-05 PROCEDURE — 0031A PR IMMUNIZ ADMIN, SARS-COV-2 COVID-19 VACC, 5X10VP/0.5ML: CPT | Mod: CV19,S$GLB,, | Performed by: INTERNAL MEDICINE

## 2021-03-15 ENCOUNTER — OFFICE VISIT (OUTPATIENT)
Dept: PRIMARY CARE CLINIC | Facility: CLINIC | Age: 51
End: 2021-03-15
Payer: MEDICAID

## 2021-03-15 VITALS
HEART RATE: 75 BPM | OXYGEN SATURATION: 97 % | HEIGHT: 69 IN | WEIGHT: 216.69 LBS | SYSTOLIC BLOOD PRESSURE: 138 MMHG | BODY MASS INDEX: 32.09 KG/M2 | DIASTOLIC BLOOD PRESSURE: 88 MMHG

## 2021-03-15 DIAGNOSIS — I10 ESSENTIAL HYPERTENSION: ICD-10-CM

## 2021-03-15 DIAGNOSIS — E03.8 SUBCLINICAL HYPOTHYROIDISM: ICD-10-CM

## 2021-03-15 DIAGNOSIS — L28.0 LICHEN SIMPLEX CHRONICUS: ICD-10-CM

## 2021-03-15 DIAGNOSIS — Z00.00 ANNUAL PHYSICAL EXAM: Primary | ICD-10-CM

## 2021-03-15 DIAGNOSIS — E78.2 HYPERLIPIDEMIA, MIXED: ICD-10-CM

## 2021-03-15 DIAGNOSIS — R73.01 IMPAIRED FASTING GLUCOSE: ICD-10-CM

## 2021-03-15 PROCEDURE — 99214 PR OFFICE/OUTPT VISIT, EST, LEVL IV, 30-39 MIN: ICD-10-PCS | Mod: S$PBB,,, | Performed by: INTERNAL MEDICINE

## 2021-03-15 PROCEDURE — 99214 OFFICE O/P EST MOD 30 MIN: CPT | Mod: S$PBB,,, | Performed by: INTERNAL MEDICINE

## 2021-03-15 PROCEDURE — 99999 PR PBB SHADOW E&M-EST. PATIENT-LVL III: CPT | Mod: PBBFAC,,, | Performed by: INTERNAL MEDICINE

## 2021-03-15 PROCEDURE — 99999 PR PBB SHADOW E&M-EST. PATIENT-LVL III: ICD-10-PCS | Mod: PBBFAC,,, | Performed by: INTERNAL MEDICINE

## 2021-03-15 PROCEDURE — 99213 OFFICE O/P EST LOW 20 MIN: CPT | Mod: PBBFAC,PN | Performed by: INTERNAL MEDICINE

## 2021-04-20 DIAGNOSIS — Z00.00 ANNUAL PHYSICAL EXAM: ICD-10-CM

## 2021-04-20 DIAGNOSIS — I10 ESSENTIAL HYPERTENSION: ICD-10-CM

## 2021-04-20 RX ORDER — IRBESARTAN AND HYDROCHLOROTHIAZIDE 150; 12.5 MG/1; MG/1
1 TABLET, FILM COATED ORAL DAILY
Qty: 90 TABLET | Refills: 1 | Status: SHIPPED | OUTPATIENT
Start: 2021-04-20 | End: 2021-10-28

## 2021-08-18 ENCOUNTER — HOSPITAL ENCOUNTER (EMERGENCY)
Facility: HOSPITAL | Age: 51
Discharge: HOME | End: 2021-08-18
Attending: EMERGENCY MEDICINE
Payer: MEDICAID

## 2021-08-18 VITALS
HEART RATE: 88 BPM | DIASTOLIC BLOOD PRESSURE: 104 MMHG | OXYGEN SATURATION: 98 % | RESPIRATION RATE: 18 BRPM | SYSTOLIC BLOOD PRESSURE: 165 MMHG | TEMPERATURE: 99 F

## 2021-08-18 DIAGNOSIS — M79.604 PAIN IN BOTH LOWER EXTREMITIES: Primary | ICD-10-CM

## 2021-08-18 DIAGNOSIS — M79.605 PAIN IN BOTH LOWER EXTREMITIES: Primary | ICD-10-CM

## 2021-08-18 LAB
BUN SERPL-MCNC: 16 MG/DL (ref 6–30)
CHLORIDE SERPL-SCNC: 105 MMOL/L (ref 95–110)
CK SERPL-CCNC: 106 U/L (ref 20–200)
CREAT SERPL-MCNC: 1 MG/DL (ref 0.5–1.4)
GLUCOSE SERPL-MCNC: 122 MG/DL (ref 70–110)
HCT VFR BLD CALC: 42 %PCV (ref 36–54)
POC IONIZED CALCIUM: 1.19 MMOL/L (ref 1.06–1.42)
POC TCO2 (MEASURED): 25 MMOL/L (ref 23–29)
POTASSIUM BLD-SCNC: 4.6 MMOL/L (ref 3.5–5.1)
SAMPLE: ABNORMAL
SODIUM BLD-SCNC: 141 MMOL/L (ref 136–145)

## 2021-08-18 PROCEDURE — 82550 ASSAY OF CK (CPK): CPT | Performed by: EMERGENCY MEDICINE

## 2021-08-18 PROCEDURE — 87389 HIV-1 AG W/HIV-1&-2 AB AG IA: CPT | Performed by: EMERGENCY MEDICINE

## 2021-08-18 PROCEDURE — 99284 EMERGENCY DEPT VISIT MOD MDM: CPT | Mod: ,,, | Performed by: EMERGENCY MEDICINE

## 2021-08-18 PROCEDURE — 63600175 PHARM REV CODE 636 W HCPCS: Performed by: EMERGENCY MEDICINE

## 2021-08-18 PROCEDURE — 99284 PR EMERGENCY DEPT VISIT,LEVEL IV: ICD-10-PCS | Mod: ,,, | Performed by: EMERGENCY MEDICINE

## 2021-08-18 PROCEDURE — 99284 EMERGENCY DEPT VISIT MOD MDM: CPT | Mod: 25

## 2021-08-18 PROCEDURE — 86803 HEPATITIS C AB TEST: CPT | Performed by: EMERGENCY MEDICINE

## 2021-08-18 PROCEDURE — 80047 BASIC METABLC PNL IONIZED CA: CPT | Mod: 91

## 2021-08-18 PROCEDURE — 96374 THER/PROPH/DIAG INJ IV PUSH: CPT

## 2021-08-18 RX ORDER — IBUPROFEN 600 MG/1
600 TABLET ORAL EVERY 6 HOURS PRN
Qty: 20 TABLET | Refills: 0 | Status: SHIPPED | OUTPATIENT
Start: 2021-08-18

## 2021-08-18 RX ORDER — KETOROLAC TROMETHAMINE 30 MG/ML
15 INJECTION, SOLUTION INTRAMUSCULAR; INTRAVENOUS
Status: COMPLETED | OUTPATIENT
Start: 2021-08-18 | End: 2021-08-18

## 2021-08-18 RX ADMIN — KETOROLAC TROMETHAMINE 15 MG: 30 INJECTION, SOLUTION INTRAMUSCULAR; INTRAVENOUS at 04:08

## 2021-08-19 LAB
HCV AB SERPL QL IA: NEGATIVE
HIV 1+2 AB+HIV1 P24 AG SERPL QL IA: NEGATIVE

## 2021-09-20 ENCOUNTER — LAB VISIT (OUTPATIENT)
Dept: LAB | Facility: HOSPITAL | Age: 51
End: 2021-09-20
Attending: INTERNAL MEDICINE
Payer: MEDICAID

## 2021-09-20 ENCOUNTER — OFFICE VISIT (OUTPATIENT)
Dept: PRIMARY CARE CLINIC | Facility: CLINIC | Age: 51
End: 2021-09-20
Payer: MEDICAID

## 2021-09-20 VITALS
HEIGHT: 69 IN | DIASTOLIC BLOOD PRESSURE: 100 MMHG | OXYGEN SATURATION: 97 % | TEMPERATURE: 98 F | BODY MASS INDEX: 32.2 KG/M2 | HEART RATE: 68 BPM | WEIGHT: 217.38 LBS | SYSTOLIC BLOOD PRESSURE: 134 MMHG

## 2021-09-20 DIAGNOSIS — E03.8 SUBCLINICAL HYPOTHYROIDISM: ICD-10-CM

## 2021-09-20 DIAGNOSIS — E78.2 HYPERLIPIDEMIA, MIXED: ICD-10-CM

## 2021-09-20 DIAGNOSIS — Z00.00 ANNUAL PHYSICAL EXAM: Primary | ICD-10-CM

## 2021-09-20 DIAGNOSIS — R73.01 IMPAIRED FASTING GLUCOSE: ICD-10-CM

## 2021-09-20 DIAGNOSIS — Z00.00 ANNUAL PHYSICAL EXAM: ICD-10-CM

## 2021-09-20 DIAGNOSIS — L28.0 LICHEN SIMPLEX CHRONICUS: ICD-10-CM

## 2021-09-20 DIAGNOSIS — I10 ESSENTIAL HYPERTENSION: ICD-10-CM

## 2021-09-20 PROBLEM — R79.89 ELEVATED LFTS: Status: RESOLVED | Noted: 2020-11-13 | Resolved: 2021-09-20

## 2021-09-20 PROBLEM — M79.661 PAIN AND SWELLING OF LOWER LEG, RIGHT: Status: RESOLVED | Noted: 2020-11-11 | Resolved: 2021-09-20

## 2021-09-20 PROBLEM — R59.9 ENLARGED LYMPH NODE: Status: RESOLVED | Noted: 2020-11-11 | Resolved: 2021-09-20

## 2021-09-20 PROBLEM — M79.89 PAIN AND SWELLING OF LOWER LEG, RIGHT: Status: RESOLVED | Noted: 2020-11-11 | Resolved: 2021-09-20

## 2021-09-20 LAB
ALBUMIN SERPL BCP-MCNC: 4.5 G/DL (ref 3.5–5.2)
ALP SERPL-CCNC: 54 U/L (ref 55–135)
ALT SERPL W/O P-5'-P-CCNC: 31 U/L (ref 10–44)
ANION GAP SERPL CALC-SCNC: 14 MMOL/L (ref 8–16)
AST SERPL-CCNC: 27 U/L (ref 10–40)
BASOPHILS # BLD AUTO: 0.03 K/UL (ref 0–0.2)
BASOPHILS NFR BLD: 0.7 % (ref 0–1.9)
BILIRUB SERPL-MCNC: 0.6 MG/DL (ref 0.1–1)
BUN SERPL-MCNC: 11 MG/DL (ref 6–20)
CALCIUM SERPL-MCNC: 9.7 MG/DL (ref 8.7–10.5)
CHLORIDE SERPL-SCNC: 103 MMOL/L (ref 95–110)
CHOLEST SERPL-MCNC: 193 MG/DL (ref 120–199)
CHOLEST/HDLC SERPL: 4.7 {RATIO} (ref 2–5)
CO2 SERPL-SCNC: 25 MMOL/L (ref 23–29)
COMPLEXED PSA SERPL-MCNC: 1.4 NG/ML (ref 0–4)
CREAT SERPL-MCNC: 1.2 MG/DL (ref 0.5–1.4)
DIFFERENTIAL METHOD: ABNORMAL
EOSINOPHIL # BLD AUTO: 0 K/UL (ref 0–0.5)
EOSINOPHIL NFR BLD: 1 % (ref 0–8)
ERYTHROCYTE [DISTWIDTH] IN BLOOD BY AUTOMATED COUNT: 12.7 % (ref 11.5–14.5)
EST. GFR  (AFRICAN AMERICAN): >60 ML/MIN/1.73 M^2
EST. GFR  (NON AFRICAN AMERICAN): >60 ML/MIN/1.73 M^2
ESTIMATED AVG GLUCOSE: 137 MG/DL (ref 68–131)
GLUCOSE SERPL-MCNC: 116 MG/DL (ref 70–110)
HBA1C MFR BLD: 6.4 % (ref 4–5.6)
HCT VFR BLD AUTO: 44.8 % (ref 40–54)
HDLC SERPL-MCNC: 41 MG/DL (ref 40–75)
HDLC SERPL: 21.2 % (ref 20–50)
HGB BLD-MCNC: 15.4 G/DL (ref 14–18)
IMM GRANULOCYTES # BLD AUTO: 0.01 K/UL (ref 0–0.04)
IMM GRANULOCYTES NFR BLD AUTO: 0.2 % (ref 0–0.5)
LDLC SERPL CALC-MCNC: 130.6 MG/DL (ref 63–159)
LYMPHOCYTES # BLD AUTO: 2 K/UL (ref 1–4.8)
LYMPHOCYTES NFR BLD: 50.7 % (ref 18–48)
MCH RBC QN AUTO: 29.8 PG (ref 27–31)
MCHC RBC AUTO-ENTMCNC: 34.4 G/DL (ref 32–36)
MCV RBC AUTO: 87 FL (ref 82–98)
MONOCYTES # BLD AUTO: 0.4 K/UL (ref 0.3–1)
MONOCYTES NFR BLD: 10.7 % (ref 4–15)
NEUTROPHILS # BLD AUTO: 1.5 K/UL (ref 1.8–7.7)
NEUTROPHILS NFR BLD: 36.7 % (ref 38–73)
NONHDLC SERPL-MCNC: 152 MG/DL
NRBC BLD-RTO: 0 /100 WBC
PLATELET # BLD AUTO: 255 K/UL (ref 150–450)
PMV BLD AUTO: 8.9 FL (ref 9.2–12.9)
POTASSIUM SERPL-SCNC: 4.6 MMOL/L (ref 3.5–5.1)
PROT SERPL-MCNC: 7.8 G/DL (ref 6–8.4)
RBC # BLD AUTO: 5.16 M/UL (ref 4.6–6.2)
SODIUM SERPL-SCNC: 142 MMOL/L (ref 136–145)
T4 FREE SERPL-MCNC: 0.81 NG/DL (ref 0.71–1.51)
TRIGL SERPL-MCNC: 107 MG/DL (ref 30–150)
TSH SERPL DL<=0.005 MIU/L-ACNC: 1.45 UIU/ML (ref 0.4–4)
WBC # BLD AUTO: 4.02 K/UL (ref 3.9–12.7)

## 2021-09-20 PROCEDURE — 84153 ASSAY OF PSA TOTAL: CPT | Performed by: INTERNAL MEDICINE

## 2021-09-20 PROCEDURE — 99396 PR PREVENTIVE VISIT,EST,40-64: ICD-10-PCS | Mod: S$PBB,,, | Performed by: INTERNAL MEDICINE

## 2021-09-20 PROCEDURE — 83036 HEMOGLOBIN GLYCOSYLATED A1C: CPT | Performed by: INTERNAL MEDICINE

## 2021-09-20 PROCEDURE — 80061 LIPID PANEL: CPT | Performed by: INTERNAL MEDICINE

## 2021-09-20 PROCEDURE — 85025 COMPLETE CBC W/AUTO DIFF WBC: CPT | Performed by: INTERNAL MEDICINE

## 2021-09-20 PROCEDURE — 99999 PR PBB SHADOW E&M-EST. PATIENT-LVL III: ICD-10-PCS | Mod: PBBFAC,,, | Performed by: INTERNAL MEDICINE

## 2021-09-20 PROCEDURE — 99396 PREV VISIT EST AGE 40-64: CPT | Mod: S$PBB,,, | Performed by: INTERNAL MEDICINE

## 2021-09-20 PROCEDURE — 80053 COMPREHEN METABOLIC PANEL: CPT | Performed by: INTERNAL MEDICINE

## 2021-09-20 PROCEDURE — 84439 ASSAY OF FREE THYROXINE: CPT | Performed by: INTERNAL MEDICINE

## 2021-09-20 PROCEDURE — 99999 PR PBB SHADOW E&M-EST. PATIENT-LVL III: CPT | Mod: PBBFAC,,, | Performed by: INTERNAL MEDICINE

## 2021-09-20 PROCEDURE — 99213 OFFICE O/P EST LOW 20 MIN: CPT | Mod: PBBFAC,PN | Performed by: INTERNAL MEDICINE

## 2021-09-20 PROCEDURE — 84443 ASSAY THYROID STIM HORMONE: CPT | Performed by: INTERNAL MEDICINE

## 2021-09-20 PROCEDURE — 36415 COLL VENOUS BLD VENIPUNCTURE: CPT | Mod: PN | Performed by: INTERNAL MEDICINE

## 2021-09-21 ENCOUNTER — PATIENT MESSAGE (OUTPATIENT)
Dept: FAMILY MEDICINE | Facility: CLINIC | Age: 51
End: 2021-09-21

## 2021-10-27 DIAGNOSIS — I10 ESSENTIAL HYPERTENSION: ICD-10-CM

## 2021-10-27 DIAGNOSIS — Z00.00 ANNUAL PHYSICAL EXAM: ICD-10-CM

## 2021-10-28 RX ORDER — IRBESARTAN AND HYDROCHLOROTHIAZIDE 150; 12.5 MG/1; MG/1
TABLET, FILM COATED ORAL
Qty: 90 TABLET | Refills: 3 | Status: SHIPPED | OUTPATIENT
Start: 2021-10-28 | End: 2022-09-26 | Stop reason: SDUPTHER

## 2022-03-25 ENCOUNTER — OFFICE VISIT (OUTPATIENT)
Dept: INTERNAL MEDICINE | Facility: CLINIC | Age: 52
End: 2022-03-25
Payer: MEDICAID

## 2022-03-25 VITALS
DIASTOLIC BLOOD PRESSURE: 88 MMHG | SYSTOLIC BLOOD PRESSURE: 130 MMHG | HEART RATE: 69 BPM | HEIGHT: 69 IN | WEIGHT: 220.25 LBS | BODY MASS INDEX: 32.62 KG/M2 | OXYGEN SATURATION: 99 %

## 2022-03-25 DIAGNOSIS — E03.8 SUBCLINICAL HYPOTHYROIDISM: ICD-10-CM

## 2022-03-25 DIAGNOSIS — R73.01 IMPAIRED FASTING GLUCOSE: ICD-10-CM

## 2022-03-25 DIAGNOSIS — I10 ESSENTIAL HYPERTENSION: Primary | ICD-10-CM

## 2022-03-25 DIAGNOSIS — L28.0 LICHEN SIMPLEX CHRONICUS: ICD-10-CM

## 2022-03-25 DIAGNOSIS — E78.2 HYPERLIPIDEMIA, MIXED: ICD-10-CM

## 2022-03-25 PROCEDURE — 1159F MED LIST DOCD IN RCRD: CPT | Mod: CPTII,,, | Performed by: INTERNAL MEDICINE

## 2022-03-25 PROCEDURE — 99999 PR PBB SHADOW E&M-EST. PATIENT-LVL III: CPT | Mod: PBBFAC,,, | Performed by: INTERNAL MEDICINE

## 2022-03-25 PROCEDURE — 3075F PR MOST RECENT SYSTOLIC BLOOD PRESS GE 130-139MM HG: ICD-10-PCS | Mod: CPTII,,, | Performed by: INTERNAL MEDICINE

## 2022-03-25 PROCEDURE — 1160F RVW MEDS BY RX/DR IN RCRD: CPT | Mod: CPTII,,, | Performed by: INTERNAL MEDICINE

## 2022-03-25 PROCEDURE — 1160F PR REVIEW ALL MEDS BY PRESCRIBER/CLIN PHARMACIST DOCUMENTED: ICD-10-PCS | Mod: CPTII,,, | Performed by: INTERNAL MEDICINE

## 2022-03-25 PROCEDURE — 3008F BODY MASS INDEX DOCD: CPT | Mod: CPTII,,, | Performed by: INTERNAL MEDICINE

## 2022-03-25 PROCEDURE — 3075F SYST BP GE 130 - 139MM HG: CPT | Mod: CPTII,,, | Performed by: INTERNAL MEDICINE

## 2022-03-25 PROCEDURE — 3079F PR MOST RECENT DIASTOLIC BLOOD PRESSURE 80-89 MM HG: ICD-10-PCS | Mod: CPTII,,, | Performed by: INTERNAL MEDICINE

## 2022-03-25 PROCEDURE — 99214 OFFICE O/P EST MOD 30 MIN: CPT | Mod: S$PBB,,, | Performed by: INTERNAL MEDICINE

## 2022-03-25 PROCEDURE — 99214 PR OFFICE/OUTPT VISIT, EST, LEVL IV, 30-39 MIN: ICD-10-PCS | Mod: S$PBB,,, | Performed by: INTERNAL MEDICINE

## 2022-03-25 PROCEDURE — 3079F DIAST BP 80-89 MM HG: CPT | Mod: CPTII,,, | Performed by: INTERNAL MEDICINE

## 2022-03-25 PROCEDURE — 3008F PR BODY MASS INDEX (BMI) DOCUMENTED: ICD-10-PCS | Mod: CPTII,,, | Performed by: INTERNAL MEDICINE

## 2022-03-25 PROCEDURE — 1159F PR MEDICATION LIST DOCUMENTED IN MEDICAL RECORD: ICD-10-PCS | Mod: CPTII,,, | Performed by: INTERNAL MEDICINE

## 2022-03-25 PROCEDURE — 99213 OFFICE O/P EST LOW 20 MIN: CPT | Mod: PBBFAC | Performed by: INTERNAL MEDICINE

## 2022-03-25 PROCEDURE — 99999 PR PBB SHADOW E&M-EST. PATIENT-LVL III: ICD-10-PCS | Mod: PBBFAC,,, | Performed by: INTERNAL MEDICINE

## 2022-03-25 NOTE — PROGRESS NOTES
[facility] Note    Chief Complaint      Chief Complaint   Patient presents with    Follow-up     6 month f/u       History of Present Illness      Matthew Phoenix Jr is a 51 y.o. male with chronic conditions of HTN, HLD, IFG, subclinical hypothyroidism, lichen simplex chronicus who presents today for: follow up chronic conditions.  HTN: BP at goal on irbesartan-hctz.  HLD: Last .  Focusing on diet.    IFG: FBG due.  Due for A1C.  Subclinical hypothyroidism:  TSH at goal last check.  Denies overt hypothyroidism symptoms.  Lichen simplex: Saw Dr. Mason.  Controlled on triamcinolone and cerave cream as needed.   Flu shot declines.  Td 6 yrs ago.  Shingles vaccine due age 49. Pneumonia vaccine due age 64.  COVID UTD.    Cscope 2019, Dr. Metz, no polyps, 10 yr interval.    Past Medical History:  Past Medical History:   Diagnosis Date    Hypertension        Past Surgical History:   has a past surgical history that includes Fracture surgery (Right, 1999).    Family History:  family history includes Diabetes in his sister; Eczema in his daughter; Throat cancer in his father.     Social History:  Social History     Tobacco Use    Smoking status: Never Smoker    Smokeless tobacco: Never Used   Substance Use Topics    Alcohol use: Yes    Drug use: Never       I personally reviewed all past medical, surgical, social and family history.    Review of Systems   Constitutional: Negative for chills, fever and malaise/fatigue.   Respiratory: Negative for shortness of breath.    Cardiovascular: Negative for chest pain.   Gastrointestinal: Negative for constipation, diarrhea, nausea and vomiting.   Skin: Negative for rash.   Neurological: Negative for weakness.   All other systems reviewed and are negative.       Medications:  Outpatient Encounter Medications as of 3/25/2022   Medication Sig Dispense Refill    irbesartan-hydrochlorothiazide (AVALIDE) 150-12.5 mg per tablet TAKE 1 TABLET BY MOUTH EVERY  "DAY 90 tablet 3    ibuprofen (ADVIL,MOTRIN) 600 MG tablet Take 1 tablet (600 mg total) by mouth every 6 (six) hours as needed for Pain. 20 tablet 0    Lactobacillus rhamnosus GG (CULTURELLE) 10 billion cell capsule Take 1 capsule by mouth 2 (two) times daily.       No facility-administered encounter medications on file as of 3/25/2022.       Allergies:  Review of patient's allergies indicates:   Allergen Reactions    Penicillins        Health Maintenance:  Immunization History   Administered Date(s) Administered    COVID-19, MRNA, LN-S, PF (Pfizer) (Purple Cap) 12/17/2021    COVID-19, vector-nr, rS-Ad26, PF (Subtextual) 03/05/2021    Td (ADULT) 09/15/2005      Health Maintenance   Topic Date Due    TETANUS VACCINE  09/15/2015    Lipid Panel  09/20/2026    Hepatitis C Screening  Completed        Physical Exam      Vital Signs  Pulse: 69  SpO2: 99 %  BP: 130/88  BP Location: Left arm  Patient Position: Sitting  Pain Score: 0-No pain  Height and Weight  Height: 5' 9" (175.3 cm)  Weight: 99.9 kg (220 lb 3.8 oz)  BSA (Calculated - sq m): 2.21 sq meters  BMI (Calculated): 32.5  Weight in (lb) to have BMI = 25: 168.9]    Physical Exam  Vitals reviewed.   Constitutional:       Appearance: He is well-developed.   HENT:      Head: Normocephalic and atraumatic.      Right Ear: External ear normal.      Left Ear: External ear normal.   Cardiovascular:      Rate and Rhythm: Normal rate and regular rhythm.      Heart sounds: Normal heart sounds. No murmur heard.  Pulmonary:      Effort: Pulmonary effort is normal.      Breath sounds: Normal breath sounds. No wheezing or rales.   Abdominal:      General: Bowel sounds are normal.      Palpations: Abdomen is soft.          Laboratory:  CBC:  Recent Labs   Lab 11/13/20  0553 11/14/20  0615 08/18/21  0414 09/20/21  1042   WBC 7.29 6.39  --  4.02   RBC 4.22 L 4.46 L  --  5.16   Hemoglobin 12.7 L 13.5 L  --  15.4   POC Hematocrit  --   --    < >  --    Hematocrit 37.9 L 40.1  --  " 44.8   Platelets 173 205  --  255   MCV 90 90  --  87   MCH 30.1 30.3  --  29.8   MCHC 33.5 33.7  --  34.4    < > = values in this interval not displayed.     CMP:  Recent Labs   Lab 11/13/20  0553 11/14/20  0615 09/20/21  1042   Glucose 132 H 101 116 H   Calcium 8.6 L 9.2 9.7   Albumin 3.3 L 3.4 L 4.5   Total Protein 6.9 7.6 7.8   Sodium 136 139 142   Potassium 3.8 4.4 4.6   CO2 22 L 25 25   Chloride 105 105 103   BUN 11 12 11   Alkaline Phosphatase 117 123 54 L   ALT 93 H 89 H 31   AST 86 H 52 H 27   Total Bilirubin 0.6 0.6 0.6     URINALYSIS:  Recent Labs   Lab 11/11/20  1239   Color, UA Beba   Specific Gravity, UA 1.030   pH, UA 5.0   Protein, UA Negative   Nitrite, UA Negative   Leukocytes, UA Negative      LIPIDS:  Recent Labs   Lab 03/03/20  0636 09/22/20  0650 09/20/21  1042   TSH 5.130 H 6.740 H 1.446   HDL 45 43 41   Cholesterol 197 168 193   Triglycerides 108 114 107   LDL Calculated 130 H 104 H  --    LDL Cholesterol  --   --  130.6   HDL/Cholesterol Ratio  --   --  21.2   Non-HDL Cholesterol  --   --  152   Total Cholesterol/HDL Ratio  --   --  4.7     TSH:  Recent Labs   Lab 03/03/20  0636 09/22/20  0650 09/20/21  1042   TSH 5.130 H 6.740 H 1.446     A1C:  Recent Labs   Lab 09/22/20  0650 09/20/21  1042   Hemoglobin A1C 6.3 H 6.4 H       Assessment/Plan     Matthew Phoenix Jr is a 51 y.o.male with:    1. Essential hypertension  Continue current meds.    2. Hyperlipidemia, mixed  Cont diet and exercise  3. Subclinical hypothyroidism  Stable.  Update labs with next visit  4. Impaired fasting glucose  - Comprehensive Metabolic Panel; Future  - Hemoglobin A1C; Future  Discussed diet an dexercise  5. Lichen simplex chronicus  Stable    Chronic conditions status updated as per HPI.  Other than changes above, cont current medications and maintain follow up with specialists.  Follow up in about 6 months (around 9/25/2022) for Follow up visit.    No future appointments.    Jordi White MD  [facility]

## 2022-08-20 NOTE — PROGRESS NOTES
[facility] Note    Chief Complaint      Chief Complaint   Patient presents with    Hypertension       History of Present Illness      Matthew Phoenix is a 49 y.o. male with chronic conditions of HTN, HLD, IFG, subclinical hypothyroidism, lichen simplex chronicus who presents today for: follow up chronic conditions.  Has been having swelling in left leg.  Standing for prolonged periods of time worsens.  Discussed compression and sodium restrictions.    HTN: BP elevated on irbesartan-hctz but hasn't taken a pill in 2 days.  Will recheck in 2 weeks.   HLD: The 10-year ASCVD risk score (Alma Centerlcuia OLEA JrVania, et al., 2013) is: 10.6%    Values used to calculate the score:      Age: 49 years      Sex: Male      Is Non- : Yes      Diabetic: No      Tobacco smoker: No      Systolic Blood Pressure: 140 mmHg      Is BP treated: Yes      HDL Cholesterol: 45 mg/dL      Total Cholesterol: 197 mg/dL   Discussed diet control.  Will repeat labs.  IFG: Last .  Due for A1C.  Subclinical hypothyroidism: Last TSH 5.130.  Will recheck.  Denies overt hypothyroidism symptoms.  Lichen simplex: Saw Dr. Mason.  Incompletely controlled on triamcinolone and cerave cream.   Flu shot declines.  Td 6 yrs ago.  Shingles vaccine due age 49. Pneumonia vaccine due age 64.    Cscope 2019, Dr. Metz, no polyps, 10 yr interval.    Past Medical History:  History reviewed. No pertinent past medical history.    Past Surgical History:   has no past surgical history on file.    Family History:  family history includes Eczema in his daughter.     Social History:  Social History     Tobacco Use    Smoking status: Never Smoker   Substance Use Topics    Alcohol use: Yes    Drug use: Not on file       I personally reviewed all past medical, surgical, social and family history.    Review of Systems   Constitutional: Negative for chills, fever and malaise/fatigue.   Respiratory: Negative for shortness of breath.   "  Cardiovascular: Negative for chest pain.   Gastrointestinal: Negative for constipation, diarrhea, nausea and vomiting.   Skin: Negative for rash.   Neurological: Negative for weakness.   All other systems reviewed and are negative.       Medications:  Outpatient Encounter Medications as of 9/14/2020   Medication Sig Dispense Refill    irbesartan-hydrochlorothiazide (AVALIDE) 150-12.5 mg per tablet Take 1 tablet by mouth once daily. 90 tablet 3    naproxen (NAPROSYN) 500 MG tablet Take 1 tablet (500 mg total) by mouth 2 (two) times daily with meals. 30 tablet 0    triamcinolone acetonide 0.1% (KENALOG) 0.1 % cream Apply topically 2 (two) times daily. 80 g 6     No facility-administered encounter medications on file as of 9/14/2020.        Allergies:  Review of patient's allergies indicates:   Allergen Reactions    Penicillins        Health Maintenance:    There is no immunization history on file for this patient.   Health Maintenance   Topic Date Due    Hepatitis C Screening  1970    TETANUS VACCINE  10/06/1987    Lipid Panel  03/03/2025        Physical Exam      Vital Signs  Pulse: 72  BP: (!) 140/102  BP Location: Left arm  Height and Weight  Height: 5' 9" (175.3 cm)  Weight: 97.7 kg (215 lb 6.2 oz)  BSA (Calculated - sq m): 2.18 sq meters  BMI (Calculated): 31.8  Weight in (lb) to have BMI = 25: 168.9]    Physical Exam  Vitals signs reviewed.   Constitutional:       Appearance: He is well-developed.   HENT:      Head: Normocephalic and atraumatic.      Right Ear: External ear normal.      Left Ear: External ear normal.   Eyes:      Conjunctiva/sclera: Conjunctivae normal.      Pupils: Pupils are equal, round, and reactive to light.   Cardiovascular:      Rate and Rhythm: Normal rate and regular rhythm.      Heart sounds: Normal heart sounds. No murmur.   Pulmonary:      Effort: Pulmonary effort is normal.      Breath sounds: Normal breath sounds. No wheezing or rales.   Abdominal:      General: " Bowel sounds are normal. There is no distension or abdominal bruit.      Palpations: Abdomen is soft.      Tenderness: There is no abdominal tenderness.   Skin:     Findings: Rash (hyperpigmented area on right shin) present.          Laboratory:  CBC:  Recent Labs   Lab 03/03/20  0636   WBC 4.1   RBC 4.83   Hemoglobin 14.3   Hematocrit 43.6   Platelets 196   Mean Corpuscular Volume 90   Mean Corpuscular Hemoglobin 29.6   Mean Corpuscular Hemoglobin Conc 32.8     CMP:  Recent Labs   Lab 03/03/20  0636   Glucose 117 H   Calcium 9.1   Albumin 4.5   Total Protein 7.2   Sodium 141   Potassium 4.1   CO2 19 L   Chloride 104   BUN, Bld 14   Alkaline Phosphatase 53   ALT 22   AST 24   Total Bilirubin 0.5     URINALYSIS:       LIPIDS:  Recent Labs   Lab 03/03/20  0636   TSH 5.130 H   HDL 45   Cholesterol 197   Triglycerides 108   LDL Calculated 130 H     TSH:  Recent Labs   Lab 03/03/20  0636   TSH 5.130 H     A1C:        Assessment/Plan     Matthew Phoenix is a 49 y.o.male with:    1. Essential hypertension  - Comprehensive metabolic panel; Future  Continue current meds.  Update labs.  2. Hyperlipidemia, mixed  - Lipid Panel; Future  Continue diet.  Update labs.  3. Impaired fasting glucose  - Comprehensive metabolic panel; Future  Discussed diet.  Update labs.  4. Subclinical hypothyroidism  - TSH; Future  - T4, free; Future  Update labs.  5. Lichen simplex chronicus  F/U with Dr. Mason.  6. Screening for viral disease  - Hepatitis C Antibody; Future    7. Screening for HIV (human immunodeficiency virus)  - HIV 1/2 Ag/Ab (4th Gen); Future       Chronic conditions status updated as per HPI.  Other than changes above, cont current medications and maintain follow up with specialists.  Return to clinic in 6 months.    Jordi White MD  [facility]                   suture removal

## 2022-09-26 ENCOUNTER — OFFICE VISIT (OUTPATIENT)
Dept: INTERNAL MEDICINE | Facility: CLINIC | Age: 52
End: 2022-09-26
Payer: MEDICAID

## 2022-09-26 VITALS
BODY MASS INDEX: 31.87 KG/M2 | DIASTOLIC BLOOD PRESSURE: 84 MMHG | HEART RATE: 74 BPM | SYSTOLIC BLOOD PRESSURE: 130 MMHG | HEIGHT: 69 IN | WEIGHT: 215.19 LBS | OXYGEN SATURATION: 98 %

## 2022-09-26 DIAGNOSIS — L28.0 LICHEN SIMPLEX CHRONICUS: ICD-10-CM

## 2022-09-26 DIAGNOSIS — I10 ESSENTIAL HYPERTENSION: Primary | ICD-10-CM

## 2022-09-26 DIAGNOSIS — E78.2 HYPERLIPIDEMIA, MIXED: ICD-10-CM

## 2022-09-26 DIAGNOSIS — E03.8 SUBCLINICAL HYPOTHYROIDISM: ICD-10-CM

## 2022-09-26 DIAGNOSIS — Z00.00 ANNUAL PHYSICAL EXAM: ICD-10-CM

## 2022-09-26 DIAGNOSIS — Z12.5 SCREENING FOR MALIGNANT NEOPLASM OF PROSTATE: ICD-10-CM

## 2022-09-26 DIAGNOSIS — R73.01 IMPAIRED FASTING GLUCOSE: ICD-10-CM

## 2022-09-26 PROCEDURE — 3075F PR MOST RECENT SYSTOLIC BLOOD PRESS GE 130-139MM HG: ICD-10-PCS | Mod: CPTII,,, | Performed by: INTERNAL MEDICINE

## 2022-09-26 PROCEDURE — 3075F SYST BP GE 130 - 139MM HG: CPT | Mod: CPTII,,, | Performed by: INTERNAL MEDICINE

## 2022-09-26 PROCEDURE — 99396 PR PREVENTIVE VISIT,EST,40-64: ICD-10-PCS | Mod: S$PBB,,, | Performed by: INTERNAL MEDICINE

## 2022-09-26 PROCEDURE — 99213 OFFICE O/P EST LOW 20 MIN: CPT | Mod: PBBFAC | Performed by: INTERNAL MEDICINE

## 2022-09-26 PROCEDURE — 3079F DIAST BP 80-89 MM HG: CPT | Mod: CPTII,,, | Performed by: INTERNAL MEDICINE

## 2022-09-26 PROCEDURE — 3008F BODY MASS INDEX DOCD: CPT | Mod: CPTII,,, | Performed by: INTERNAL MEDICINE

## 2022-09-26 PROCEDURE — 3008F PR BODY MASS INDEX (BMI) DOCUMENTED: ICD-10-PCS | Mod: CPTII,,, | Performed by: INTERNAL MEDICINE

## 2022-09-26 PROCEDURE — 99396 PREV VISIT EST AGE 40-64: CPT | Mod: S$PBB,,, | Performed by: INTERNAL MEDICINE

## 2022-09-26 PROCEDURE — 3079F PR MOST RECENT DIASTOLIC BLOOD PRESSURE 80-89 MM HG: ICD-10-PCS | Mod: CPTII,,, | Performed by: INTERNAL MEDICINE

## 2022-09-26 PROCEDURE — 1159F MED LIST DOCD IN RCRD: CPT | Mod: CPTII,,, | Performed by: INTERNAL MEDICINE

## 2022-09-26 PROCEDURE — 99999 PR PBB SHADOW E&M-EST. PATIENT-LVL III: ICD-10-PCS | Mod: PBBFAC,,, | Performed by: INTERNAL MEDICINE

## 2022-09-26 PROCEDURE — 99999 PR PBB SHADOW E&M-EST. PATIENT-LVL III: CPT | Mod: PBBFAC,,, | Performed by: INTERNAL MEDICINE

## 2022-09-26 PROCEDURE — 1159F PR MEDICATION LIST DOCUMENTED IN MEDICAL RECORD: ICD-10-PCS | Mod: CPTII,,, | Performed by: INTERNAL MEDICINE

## 2022-09-26 RX ORDER — IRBESARTAN AND HYDROCHLOROTHIAZIDE 150; 12.5 MG/1; MG/1
1 TABLET, FILM COATED ORAL DAILY
Qty: 90 TABLET | Refills: 3 | Status: SHIPPED | OUTPATIENT
Start: 2022-09-26 | End: 2022-10-24 | Stop reason: SDUPTHER

## 2022-09-26 NOTE — PROGRESS NOTES
[facility] Note    Chief Complaint      Chief Complaint   Patient presents with    Annual Exam       History of Present Illness      Matthew Phoenix Jr. is a 51 y.o. male with chronic conditions of HTN, HLD, IFG, subclinical hypothyroidism, lichen simplex chronicus who presents today for: annual preventative visit.  HTN: BP at goal on irbesartan-hctz.  HLD: Last .  Focusing on diet.    IFG: FBG due.  Due for A1C.  Subclinical hypothyroidism:  TSH at goal last check.  Denies overt hypothyroidism symptoms.  Lichen simplex: Saw Dr. Mason.  Controlled on triamcinolone and cerave cream as needed.   Diet: Prepares own food mostly.  Limiting fatty foods and carbs.  Drinks plenty water.    Exercise: rides bike regularly 10 miles.    Denies drinking and driving, drinking more than 4 drinks on occasion, drug use.     Flu shot declines.  Td 7 yrs ago.  Shingles vaccine due age 49. Pneumonia vaccine due age 64.  COVID UTD.    Cscope 2019, Dr. Metz, no polyps, 10 yr interval.    Past Medical History:  Past Medical History:   Diagnosis Date    Hypertension        Past Surgical History:   has a past surgical history that includes Fracture surgery (Right, 1999).    Family History:  family history includes Diabetes in his sister; Eczema in his daughter; Throat cancer in his father.     Social History:  Social History     Tobacco Use    Smoking status: Never    Smokeless tobacco: Never   Substance Use Topics    Alcohol use: Yes    Drug use: Never       I personally reviewed all past medical, surgical, social and family history.    Review of Systems   Constitutional:  Negative for chills, fever and malaise/fatigue.   Respiratory:  Negative for shortness of breath.    Cardiovascular:  Negative for chest pain.   Gastrointestinal:  Negative for constipation, diarrhea, nausea and vomiting.   Skin:  Negative for rash.   Neurological:  Negative for weakness.   All other systems reviewed and are negative.  "    Medications:  Outpatient Encounter Medications as of 9/26/2022   Medication Sig Dispense Refill    ibuprofen (ADVIL,MOTRIN) 600 MG tablet Take 1 tablet (600 mg total) by mouth every 6 (six) hours as needed for Pain. 20 tablet 0    irbesartan-hydrochlorothiazide (AVALIDE) 150-12.5 mg per tablet Take 1 tablet by mouth once daily. 90 tablet 3    Lactobacillus rhamnosus GG (CULTURELLE) 10 billion cell capsule Take 1 capsule by mouth 2 (two) times daily.      [DISCONTINUED] irbesartan-hydrochlorothiazide (AVALIDE) 150-12.5 mg per tablet TAKE 1 TABLET BY MOUTH EVERY DAY 90 tablet 3     No facility-administered encounter medications on file as of 9/26/2022.       Allergies:  Review of patient's allergies indicates:   Allergen Reactions    Penicillins        Health Maintenance:  Immunization History   Administered Date(s) Administered    COVID-19, MRNA, LN-S, PF (Pfizer) (Purple Cap) 12/17/2021    COVID-19, vector-nr, rS-Ad26, PF ([employer]) 03/05/2021    Td (ADULT) 09/15/2005      Health Maintenance   Topic Date Due    TETANUS VACCINE  09/15/2015    Lipid Panel  09/20/2026    Hepatitis C Screening  Completed        Physical Exam      Vital Signs  Pulse: 74  SpO2: 98 %  BP: 130/84  BP Location: Left arm  Patient Position: Sitting  Pain Score: 0-No pain  Height and Weight  Height: 5' 9" (175.3 cm)  Weight: 97.6 kg (215 lb 2.7 oz)  BSA (Calculated - sq m): 2.18 sq meters  BMI (Calculated): 31.8  Weight in (lb) to have BMI = 25: 168.9]    Physical Exam  Vitals reviewed.   Constitutional:       Appearance: He is well-developed.   HENT:      Head: Normocephalic and atraumatic.      Right Ear: External ear normal.      Left Ear: External ear normal.   Cardiovascular:      Rate and Rhythm: Normal rate and regular rhythm.      Heart sounds: Normal heart sounds. No murmur heard.  Pulmonary:      Effort: Pulmonary effort is normal.      Breath sounds: Normal breath sounds. No wheezing or rales.   Abdominal:      General: Bowel " sounds are normal.      Palpations: Abdomen is soft.        Laboratory:  CBC:  Recent Labs   Lab 11/13/20  0553 11/14/20  0615 08/18/21  0414 09/20/21  1042   WBC 7.29 6.39  --  4.02   RBC 4.22 L 4.46 L  --  5.16   Hemoglobin 12.7 L 13.5 L  --  15.4   POC Hematocrit  --   --    < >  --    Hematocrit 37.9 L 40.1  --  44.8   Platelets 173 205  --  255   MCV 90 90  --  87   MCH 30.1 30.3  --  29.8   MCHC 33.5 33.7  --  34.4    < > = values in this interval not displayed.     CMP:  Recent Labs   Lab 11/13/20  0553 11/14/20  0615 09/20/21  1042   Glucose 132 H 101 116 H   Calcium 8.6 L 9.2 9.7   Albumin 3.3 L 3.4 L 4.5   Total Protein 6.9 7.6 7.8   Sodium 136 139 142   Potassium 3.8 4.4 4.6   CO2 22 L 25 25   Chloride 105 105 103   BUN 11 12 11   Alkaline Phosphatase 117 123 54 L   ALT 93 H 89 H 31   AST 86 H 52 H 27   Total Bilirubin 0.6 0.6 0.6     URINALYSIS:  Recent Labs   Lab 11/11/20  1239   Color, UA Beba   Specific Gravity, UA 1.030   pH, UA 5.0   Protein, UA Negative   Nitrite, UA Negative   Leukocytes, UA Negative      LIPIDS:  Recent Labs   Lab 03/03/20  0636 09/22/20  0650 09/20/21  1042   TSH 5.130 H 6.740 H 1.446   HDL 45 43 41   Cholesterol 197 168 193   Triglycerides 108 114 107   LDL Calculated 130 H 104 H  --    LDL Cholesterol  --   --  130.6   HDL/Cholesterol Ratio  --   --  21.2   Non-HDL Cholesterol  --   --  152   Total Cholesterol/HDL Ratio  --   --  4.7     TSH:  Recent Labs   Lab 03/03/20  0636 09/22/20  0650 09/20/21  1042   TSH 5.130 H 6.740 H 1.446     A1C:  Recent Labs   Lab 09/22/20  0650 09/20/21  1042   Hemoglobin A1C 6.3 H 6.4 H       Assessment/Plan     Matthew Phoenix Jr. is a 51 y.o.male with:    1. Annual physical exam  - CBC Auto Differential; Future  - Comprehensive Metabolic Panel; Future  - Lipid Panel; Future  - TSH; Future  - T4, Free; Future  - PSA, Screening; Future  - Hemoglobin A1C; Future  - irbesartan-hydrochlorothiazide (AVALIDE) 150-12.5 mg per tablet; Take 1 tablet by  mouth once daily.  Dispense: 90 tablet; Refill: 3  Discussed diet and exercise, vaccines and cancer screening, risk factors.  Screening labs ordered.     2. Essential hypertension  - irbesartan-hydrochlorothiazide (AVALIDE) 150-12.5 mg per tablet; Take 1 tablet by mouth once daily.  Dispense: 90 tablet; Refill: 3  Continue current meds.    3. Hyperlipidemia, mixed  - Comprehensive Metabolic Panel; Future  - Lipid Panel; Future  Cont diet.  4. Subclinical hypothyroidism  - CBC Auto Differential; Future  - TSH; Future  - T4, Free; Future  Cont to monitor  5. Impaired fasting glucose  - Hemoglobin A1C; Future    6. Lichen simplex chronicus    7. Screening for malignant neoplasm of prostate  - PSA, Screening; Future     Chronic conditions status updated as per HPI.  Other than changes above, cont current medications and maintain follow up with specialists.  Follow up in about 1 year (around 9/26/2023) for Annual preventative visit.    No future appointments.    Jordi White MD  [facility]

## 2022-10-03 ENCOUNTER — LAB VISIT (OUTPATIENT)
Dept: LAB | Facility: HOSPITAL | Age: 52
End: 2022-10-03
Attending: INTERNAL MEDICINE
Payer: MEDICAID

## 2022-10-03 DIAGNOSIS — Z00.00 ANNUAL PHYSICAL EXAM: ICD-10-CM

## 2022-10-03 DIAGNOSIS — E03.8 SUBCLINICAL HYPOTHYROIDISM: ICD-10-CM

## 2022-10-03 DIAGNOSIS — R73.01 IMPAIRED FASTING GLUCOSE: ICD-10-CM

## 2022-10-03 DIAGNOSIS — E78.2 HYPERLIPIDEMIA, MIXED: ICD-10-CM

## 2022-10-03 DIAGNOSIS — Z12.5 SCREENING FOR MALIGNANT NEOPLASM OF PROSTATE: ICD-10-CM

## 2022-10-03 LAB
ALBUMIN SERPL BCP-MCNC: 4.3 G/DL (ref 3.5–5.2)
ALP SERPL-CCNC: 54 U/L (ref 55–135)
ALT SERPL W/O P-5'-P-CCNC: 29 U/L (ref 10–44)
ANION GAP SERPL CALC-SCNC: 10 MMOL/L (ref 8–16)
AST SERPL-CCNC: 22 U/L (ref 10–40)
BASOPHILS # BLD AUTO: 0.03 K/UL (ref 0–0.2)
BASOPHILS NFR BLD: 0.7 % (ref 0–1.9)
BILIRUB SERPL-MCNC: 0.5 MG/DL (ref 0.1–1)
BUN SERPL-MCNC: 11 MG/DL (ref 6–20)
CALCIUM SERPL-MCNC: 9.5 MG/DL (ref 8.7–10.5)
CHLORIDE SERPL-SCNC: 103 MMOL/L (ref 95–110)
CHOLEST SERPL-MCNC: 195 MG/DL (ref 120–199)
CHOLEST/HDLC SERPL: 5 {RATIO} (ref 2–5)
CO2 SERPL-SCNC: 27 MMOL/L (ref 23–29)
COMPLEXED PSA SERPL-MCNC: 1.7 NG/ML (ref 0–4)
CREAT SERPL-MCNC: 1 MG/DL (ref 0.5–1.4)
DIFFERENTIAL METHOD: ABNORMAL
EOSINOPHIL # BLD AUTO: 0.1 K/UL (ref 0–0.5)
EOSINOPHIL NFR BLD: 1.7 % (ref 0–8)
ERYTHROCYTE [DISTWIDTH] IN BLOOD BY AUTOMATED COUNT: 12.5 % (ref 11.5–14.5)
EST. GFR  (NO RACE VARIABLE): >60 ML/MIN/1.73 M^2
ESTIMATED AVG GLUCOSE: 137 MG/DL (ref 68–131)
GLUCOSE SERPL-MCNC: 110 MG/DL (ref 70–110)
HBA1C MFR BLD: 6.4 % (ref 4–5.6)
HCT VFR BLD AUTO: 44.2 % (ref 40–54)
HDLC SERPL-MCNC: 39 MG/DL (ref 40–75)
HDLC SERPL: 20 % (ref 20–50)
HGB BLD-MCNC: 14.5 G/DL (ref 14–18)
IMM GRANULOCYTES # BLD AUTO: 0.01 K/UL (ref 0–0.04)
IMM GRANULOCYTES NFR BLD AUTO: 0.2 % (ref 0–0.5)
LDLC SERPL CALC-MCNC: 132.4 MG/DL (ref 63–159)
LYMPHOCYTES # BLD AUTO: 2.1 K/UL (ref 1–4.8)
LYMPHOCYTES NFR BLD: 50.6 % (ref 18–48)
MCH RBC QN AUTO: 29.6 PG (ref 27–31)
MCHC RBC AUTO-ENTMCNC: 32.8 G/DL (ref 32–36)
MCV RBC AUTO: 90 FL (ref 82–98)
MONOCYTES # BLD AUTO: 0.5 K/UL (ref 0.3–1)
MONOCYTES NFR BLD: 12 % (ref 4–15)
NEUTROPHILS # BLD AUTO: 1.4 K/UL (ref 1.8–7.7)
NEUTROPHILS NFR BLD: 34.8 % (ref 38–73)
NONHDLC SERPL-MCNC: 156 MG/DL
NRBC BLD-RTO: 0 /100 WBC
PLATELET # BLD AUTO: 234 K/UL (ref 150–450)
PMV BLD AUTO: 8.7 FL (ref 9.2–12.9)
POTASSIUM SERPL-SCNC: 4.4 MMOL/L (ref 3.5–5.1)
PROT SERPL-MCNC: 7.8 G/DL (ref 6–8.4)
RBC # BLD AUTO: 4.9 M/UL (ref 4.6–6.2)
SODIUM SERPL-SCNC: 140 MMOL/L (ref 136–145)
T4 FREE SERPL-MCNC: 0.82 NG/DL (ref 0.71–1.51)
TRIGL SERPL-MCNC: 118 MG/DL (ref 30–150)
TSH SERPL DL<=0.005 MIU/L-ACNC: 3.93 UIU/ML (ref 0.4–4)
WBC # BLD AUTO: 4.09 K/UL (ref 3.9–12.7)

## 2022-10-03 PROCEDURE — 80061 LIPID PANEL: CPT | Performed by: INTERNAL MEDICINE

## 2022-10-03 PROCEDURE — 84153 ASSAY OF PSA TOTAL: CPT | Performed by: INTERNAL MEDICINE

## 2022-10-03 PROCEDURE — 85025 COMPLETE CBC W/AUTO DIFF WBC: CPT | Performed by: INTERNAL MEDICINE

## 2022-10-03 PROCEDURE — 36415 COLL VENOUS BLD VENIPUNCTURE: CPT | Performed by: INTERNAL MEDICINE

## 2022-10-03 PROCEDURE — 84439 ASSAY OF FREE THYROXINE: CPT | Performed by: INTERNAL MEDICINE

## 2022-10-03 PROCEDURE — 84443 ASSAY THYROID STIM HORMONE: CPT | Performed by: INTERNAL MEDICINE

## 2022-10-03 PROCEDURE — 80053 COMPREHEN METABOLIC PANEL: CPT | Performed by: INTERNAL MEDICINE

## 2022-10-03 PROCEDURE — 83036 HEMOGLOBIN GLYCOSYLATED A1C: CPT | Performed by: INTERNAL MEDICINE

## 2022-10-24 DIAGNOSIS — Z00.00 ANNUAL PHYSICAL EXAM: ICD-10-CM

## 2022-10-24 DIAGNOSIS — I10 ESSENTIAL HYPERTENSION: ICD-10-CM

## 2022-10-24 RX ORDER — IRBESARTAN AND HYDROCHLOROTHIAZIDE 150; 12.5 MG/1; MG/1
1 TABLET, FILM COATED ORAL DAILY
Qty: 90 TABLET | Refills: 3 | Status: SHIPPED | OUTPATIENT
Start: 2022-10-24 | End: 2023-10-09 | Stop reason: SDUPTHER

## 2022-10-24 NOTE — TELEPHONE ENCOUNTER
No new care gaps identified.  [facility] Embedded Care Gaps. Reference number: [id number]. 10/24/2022   2:18:02 PM CDT

## 2023-04-03 ENCOUNTER — OFFICE VISIT (OUTPATIENT)
Dept: PRIMARY CARE CLINIC | Facility: CLINIC | Age: 53
End: 2023-04-03
Payer: MEDICAID

## 2023-04-03 VITALS
BODY MASS INDEX: 31.84 KG/M2 | SYSTOLIC BLOOD PRESSURE: 130 MMHG | WEIGHT: 214.94 LBS | HEART RATE: 89 BPM | OXYGEN SATURATION: 97 % | HEIGHT: 69 IN | DIASTOLIC BLOOD PRESSURE: 80 MMHG

## 2023-04-03 DIAGNOSIS — R73.01 IMPAIRED FASTING GLUCOSE: ICD-10-CM

## 2023-04-03 DIAGNOSIS — E78.2 HYPERLIPIDEMIA, MIXED: ICD-10-CM

## 2023-04-03 DIAGNOSIS — I10 ESSENTIAL HYPERTENSION: Primary | ICD-10-CM

## 2023-04-03 DIAGNOSIS — Z12.5 SCREENING FOR MALIGNANT NEOPLASM OF PROSTATE: ICD-10-CM

## 2023-04-03 DIAGNOSIS — L28.0 LICHEN SIMPLEX CHRONICUS: ICD-10-CM

## 2023-04-03 DIAGNOSIS — Z00.00 ANNUAL PHYSICAL EXAM: ICD-10-CM

## 2023-04-03 DIAGNOSIS — E03.8 SUBCLINICAL HYPOTHYROIDISM: ICD-10-CM

## 2023-04-03 PROCEDURE — 3075F PR MOST RECENT SYSTOLIC BLOOD PRESS GE 130-139MM HG: ICD-10-PCS | Mod: CPTII,,, | Performed by: INTERNAL MEDICINE

## 2023-04-03 PROCEDURE — 1159F MED LIST DOCD IN RCRD: CPT | Mod: CPTII,,, | Performed by: INTERNAL MEDICINE

## 2023-04-03 PROCEDURE — 99213 OFFICE O/P EST LOW 20 MIN: CPT | Mod: PBBFAC | Performed by: INTERNAL MEDICINE

## 2023-04-03 PROCEDURE — 3079F PR MOST RECENT DIASTOLIC BLOOD PRESSURE 80-89 MM HG: ICD-10-PCS | Mod: CPTII,,, | Performed by: INTERNAL MEDICINE

## 2023-04-03 PROCEDURE — 3079F DIAST BP 80-89 MM HG: CPT | Mod: CPTII,,, | Performed by: INTERNAL MEDICINE

## 2023-04-03 PROCEDURE — 99999 PR PBB SHADOW E&M-EST. PATIENT-LVL III: ICD-10-PCS | Mod: PBBFAC,,, | Performed by: INTERNAL MEDICINE

## 2023-04-03 PROCEDURE — 1159F PR MEDICATION LIST DOCUMENTED IN MEDICAL RECORD: ICD-10-PCS | Mod: CPTII,,, | Performed by: INTERNAL MEDICINE

## 2023-04-03 PROCEDURE — 99214 OFFICE O/P EST MOD 30 MIN: CPT | Mod: S$PBB,,, | Performed by: INTERNAL MEDICINE

## 2023-04-03 PROCEDURE — 3075F SYST BP GE 130 - 139MM HG: CPT | Mod: CPTII,,, | Performed by: INTERNAL MEDICINE

## 2023-04-03 PROCEDURE — 3008F PR BODY MASS INDEX (BMI) DOCUMENTED: ICD-10-PCS | Mod: CPTII,,, | Performed by: INTERNAL MEDICINE

## 2023-04-03 PROCEDURE — 99214 PR OFFICE/OUTPT VISIT, EST, LEVL IV, 30-39 MIN: ICD-10-PCS | Mod: S$PBB,,, | Performed by: INTERNAL MEDICINE

## 2023-04-03 PROCEDURE — 99999 PR PBB SHADOW E&M-EST. PATIENT-LVL III: CPT | Mod: PBBFAC,,, | Performed by: INTERNAL MEDICINE

## 2023-04-03 PROCEDURE — 3008F BODY MASS INDEX DOCD: CPT | Mod: CPTII,,, | Performed by: INTERNAL MEDICINE

## 2023-04-03 NOTE — PROGRESS NOTES
[facility] Note    Chief Complaint      Chief Complaint   Patient presents with    Hypertension     6 month        History of Present Illness      Matthew Phoenix Jr. is a 52 y.o. male with chronic conditions of HTN, HLD, subclinical hypothyroidism, IFG, lichen simplex chronicus who presents today for: follow up chronic conditions.  HTN: BP at goal on irbesartan-hctz.  HLD: Last .  Focusing on diet.    IFG: FBG .    Subclinical hypothyroidism:  TSH at goal last check.  Denies overt hypothyroidism symptoms.  Lichen simplex: Saw Dr. Mason.  Controlled on triamcinolone and cerave cream as needed.   Flu shot declines.  Td 8 yrs ago.  Shingles vaccine due age 49. Pneumonia vaccine due age 65.  COVID UTD.    Cscope 2019, Dr. Metz, no polyps, 10 yr interval.    Past Medical History:  Past Medical History:   Diagnosis Date    Hypertension        Past Surgical History:   has a past surgical history that includes Fracture surgery (Right, 1999).    Family History:  family history includes Diabetes in his sister; Eczema in his daughter; Throat cancer in his father.     Social History:  Social History     Tobacco Use    Smoking status: Never    Smokeless tobacco: Never   Substance Use Topics    Alcohol use: Yes    Drug use: Never       I personally reviewed all past medical, surgical, social and family history.    Review of Systems   Constitutional:  Negative for chills, fever and malaise/fatigue.   Respiratory:  Negative for shortness of breath.    Cardiovascular:  Negative for chest pain.   Gastrointestinal:  Negative for constipation, diarrhea, nausea and vomiting.   Skin:  Negative for rash.   Neurological:  Negative for weakness.   All other systems reviewed and are negative.     Medications:  Outpatient Encounter Medications as of 4/3/2023   Medication Sig Dispense Refill    ibuprofen (ADVIL,MOTRIN) 600 MG tablet Take 1 tablet (600 mg total) by mouth every 6 (six) hours as needed for Pain. 20  "tablet 0    irbesartan-hydrochlorothiazide (AVALIDE) 150-12.5 mg per tablet Take 1 tablet by mouth once daily. 90 tablet 3    Lactobacillus rhamnosus GG (CULTURELLE) 10 billion cell capsule Take 1 capsule by mouth 2 (two) times daily.       No facility-administered encounter medications on file as of 4/3/2023.       Allergies:  Review of patient's allergies indicates:   Allergen Reactions    Penicillins        Health Maintenance:  Immunization History   Administered Date(s) Administered    COVID-19, MRNA, LN-S, PF (Pfizer) (Purple Cap) 12/17/2021    COVID-19, vector-nr, rS-Ad26, PF ([employer]) 03/05/2021    Td (ADULT) 09/15/2005      Health Maintenance   Topic Date Due    TETANUS VACCINE  09/15/2015    Lipid Panel  10/03/2027    Hepatitis C Screening  Completed        Physical Exam      Vital Signs  Pulse: 89  SpO2: 97 %  BP: 130/80  BP Location: Right arm  Patient Position: Sitting  Pain Score: 0-No pain  Height and Weight  Height: 5' 9" (175.3 cm)  Weight: 97.5 kg (214 lb 15.2 oz)  BSA (Calculated - sq m): 2.18 sq meters  BMI (Calculated): 31.7  Weight in (lb) to have BMI = 25: 168.9]    Physical Exam  Vitals reviewed.   Constitutional:       Appearance: He is well-developed.   HENT:      Head: Normocephalic and atraumatic.      Right Ear: External ear normal.      Left Ear: External ear normal.   Cardiovascular:      Rate and Rhythm: Normal rate and regular rhythm.      Heart sounds: Normal heart sounds. No murmur heard.  Pulmonary:      Effort: Pulmonary effort is normal.      Breath sounds: Normal breath sounds. No wheezing or rales.   Abdominal:      General: Bowel sounds are normal.      Palpations: Abdomen is soft.        Laboratory:  CBC:  Recent Labs   Lab 11/14/20  0615 08/18/21  0414 09/20/21  1042 10/03/22  0829   WBC 6.39  --  4.02 4.09   RBC 4.46 L  --  5.16 4.90   Hemoglobin 13.5 L  --  15.4 14.5   POC Hematocrit  --    < >  --   --    Hematocrit 40.1  --  44.8 44.2   Platelets 205  --  255 234   MCV " 90  --  87 90   MCH 30.3  --  29.8 29.6   MCHC 33.7  --  34.4 32.8    < > = values in this interval not displayed.     CMP:  Recent Labs   Lab 11/14/20  0615 09/20/21  1042 10/03/22  0829   Glucose 101 116 H 110   Calcium 9.2 9.7 9.5   Albumin 3.4 L 4.5 4.3   Total Protein 7.6 7.8 7.8   Sodium 139 142 140   Potassium 4.4 4.6 4.4   CO2 25 25 27   Chloride 105 103 103   BUN 12 11 11   Alkaline Phosphatase 123 54 L 54 L   ALT 89 H 31 29   AST 52 H 27 22   Total Bilirubin 0.6 0.6 0.5     URINALYSIS:  Recent Labs   Lab 11/11/20  1239   Color, UA Beba   Specific Gravity, UA 1.030   pH, UA 5.0   Protein, UA Negative   Nitrite, UA Negative   Leukocytes, UA Negative      LIPIDS:  Recent Labs   Lab 09/22/20  0650 09/20/21  1042 10/03/22  0829   TSH 6.740 H 1.446 3.931   HDL 43 41 39 L   Cholesterol 168 193 195   Triglycerides 114 107 118   LDL Calculated 104 H  --   --    LDL Cholesterol  --  130.6 132.4   HDL/Cholesterol Ratio  --  21.2 20.0   Non-HDL Cholesterol  --  152 156   Total Cholesterol/HDL Ratio  --  4.7 5.0     TSH:  Recent Labs   Lab 09/22/20  0650 09/20/21  1042 10/03/22  0829   TSH 6.740 H 1.446 3.931     A1C:  Recent Labs   Lab 09/22/20  0650 09/20/21  1042 10/03/22  0829   Hemoglobin A1C 6.3 H 6.4 H 6.4 H       Assessment/Plan     Matthew Phoenix Jr. is a 52 y.o.male with:    1. Essential hypertension  Continue current meds.    2. Hyperlipidemia, mixed  - Comprehensive Metabolic Panel; Future  - Lipid Panel; Future  Counseled on diet.    3. Impaired fasting glucose  - Hemoglobin A1C; Future  Counseled on diet  4. Subclinical hypothyroidism  - TSH; Future  - T4, Free; Future  Monitor for symptoms.   6. Annual physical exam  - CBC Auto Differential; Future  - Comprehensive Metabolic Panel; Future  - Hemoglobin A1C; Future  - Lipid Panel; Future  - PSA, Screening; Future  - TSH; Future  - T4, Free; Future    7. Screening for malignant neoplasm of prostate  - PSA, Screening; Future       Chronic conditions  status updated as per HPI.  Other than changes above, cont current medications and maintain follow up with specialists.  No follow-ups on file.    No future appointments.    Jordi White MD  [facility]

## 2023-09-29 ENCOUNTER — TELEPHONE (OUTPATIENT)
Dept: INFECTIOUS DISEASES | Facility: CLINIC | Age: 53
End: 2023-09-29
Payer: MEDICAID

## 2023-09-29 ENCOUNTER — TELEPHONE (OUTPATIENT)
Dept: INTERNAL MEDICINE | Facility: CLINIC | Age: 53
End: 2023-09-29
Payer: MEDICAID

## 2023-09-29 DIAGNOSIS — L03.90 CELLULITIS, UNSPECIFIED CELLULITIS SITE: Primary | ICD-10-CM

## 2023-09-29 RX ORDER — CLINDAMYCIN HYDROCHLORIDE 300 MG/1
300 CAPSULE ORAL EVERY 8 HOURS
Qty: 21 CAPSULE | Refills: 0 | Status: SHIPPED | OUTPATIENT
Start: 2023-09-29

## 2023-09-29 NOTE — TELEPHONE ENCOUNTER
----- Message from Rosa Jerez sent at 9/29/2023 10:03 AM CDT -----  Contact: pt's wife Ross  [phone]  Ross is calling in regards to pt having same symptoms that he was admitted in the hospital for back in November. Please give her a call as soon as possible.               Thank you

## 2023-09-29 NOTE — TELEPHONE ENCOUNTER
Called and spoke to pt wife, Ross. States that pt was hospitalized in 11/2020 for an enlarged lymph node in groin. Wife states pt is having that same leg pain again. Denies swollen lymph node, fever or any other sx. Pt only c/o leg pain. Wife states AUGIE Farrell treated pt with Clindamycin last time. Explained that pt should be seen for an evaluation. Wife states she will call AUGIE Farrell with ID for an appt. Explained to wife should pt pain increase or if pt starts with swollen lymph nodes or fever over the weekend, they should report to the ED. Wife verbalized understanding. Wife states pt has an appt with PCP on 10/9.

## 2023-10-02 ENCOUNTER — LAB VISIT (OUTPATIENT)
Dept: LAB | Facility: HOSPITAL | Age: 53
End: 2023-10-02
Attending: INTERNAL MEDICINE
Payer: MEDICAID

## 2023-10-02 DIAGNOSIS — Z12.5 SCREENING FOR MALIGNANT NEOPLASM OF PROSTATE: ICD-10-CM

## 2023-10-02 DIAGNOSIS — Z00.00 ANNUAL PHYSICAL EXAM: ICD-10-CM

## 2023-10-02 DIAGNOSIS — E03.8 SUBCLINICAL HYPOTHYROIDISM: ICD-10-CM

## 2023-10-02 DIAGNOSIS — R73.01 IMPAIRED FASTING GLUCOSE: ICD-10-CM

## 2023-10-02 DIAGNOSIS — E78.2 HYPERLIPIDEMIA, MIXED: ICD-10-CM

## 2023-10-02 LAB
ALBUMIN SERPL BCP-MCNC: 4.2 G/DL (ref 3.5–5.2)
ALP SERPL-CCNC: 53 U/L (ref 55–135)
ALT SERPL W/O P-5'-P-CCNC: 43 U/L (ref 10–44)
ANION GAP SERPL CALC-SCNC: 7 MMOL/L (ref 8–16)
AST SERPL-CCNC: 32 U/L (ref 10–40)
BASOPHILS # BLD AUTO: 0.02 K/UL (ref 0–0.2)
BASOPHILS NFR BLD: 0.4 % (ref 0–1.9)
BILIRUB SERPL-MCNC: 0.6 MG/DL (ref 0.1–1)
BUN SERPL-MCNC: 11 MG/DL (ref 6–20)
CALCIUM SERPL-MCNC: 9.5 MG/DL (ref 8.7–10.5)
CHLORIDE SERPL-SCNC: 112 MMOL/L (ref 95–110)
CHOLEST SERPL-MCNC: 191 MG/DL (ref 120–199)
CHOLEST/HDLC SERPL: 4.7 {RATIO} (ref 2–5)
CO2 SERPL-SCNC: 25 MMOL/L (ref 23–29)
COMPLEXED PSA SERPL-MCNC: 1.7 NG/ML (ref 0–4)
CREAT SERPL-MCNC: 1 MG/DL (ref 0.5–1.4)
DIFFERENTIAL METHOD: ABNORMAL
EOSINOPHIL # BLD AUTO: 0 K/UL (ref 0–0.5)
EOSINOPHIL NFR BLD: 0.9 % (ref 0–8)
ERYTHROCYTE [DISTWIDTH] IN BLOOD BY AUTOMATED COUNT: 12.9 % (ref 11.5–14.5)
EST. GFR  (NO RACE VARIABLE): >60 ML/MIN/1.73 M^2
ESTIMATED AVG GLUCOSE: 134 MG/DL (ref 68–131)
GLUCOSE SERPL-MCNC: 98 MG/DL (ref 70–110)
HBA1C MFR BLD: 6.3 % (ref 4–5.6)
HCT VFR BLD AUTO: 43 % (ref 40–54)
HDLC SERPL-MCNC: 41 MG/DL (ref 40–75)
HDLC SERPL: 21.5 % (ref 20–50)
HGB BLD-MCNC: 14.4 G/DL (ref 14–18)
IMM GRANULOCYTES # BLD AUTO: 0.01 K/UL (ref 0–0.04)
IMM GRANULOCYTES NFR BLD AUTO: 0.2 % (ref 0–0.5)
LDLC SERPL CALC-MCNC: 131.4 MG/DL (ref 63–159)
LYMPHOCYTES # BLD AUTO: 2.1 K/UL (ref 1–4.8)
LYMPHOCYTES NFR BLD: 47 % (ref 18–48)
MCH RBC QN AUTO: 29.6 PG (ref 27–31)
MCHC RBC AUTO-ENTMCNC: 33.5 G/DL (ref 32–36)
MCV RBC AUTO: 89 FL (ref 82–98)
MONOCYTES # BLD AUTO: 0.5 K/UL (ref 0.3–1)
MONOCYTES NFR BLD: 11.6 % (ref 4–15)
NEUTROPHILS # BLD AUTO: 1.8 K/UL (ref 1.8–7.7)
NEUTROPHILS NFR BLD: 39.9 % (ref 38–73)
NONHDLC SERPL-MCNC: 150 MG/DL
NRBC BLD-RTO: 0 /100 WBC
PLATELET # BLD AUTO: 250 K/UL (ref 150–450)
PMV BLD AUTO: 8.8 FL (ref 9.2–12.9)
POTASSIUM SERPL-SCNC: 4.6 MMOL/L (ref 3.5–5.1)
PROT SERPL-MCNC: 7.6 G/DL (ref 6–8.4)
RBC # BLD AUTO: 4.86 M/UL (ref 4.6–6.2)
SODIUM SERPL-SCNC: 144 MMOL/L (ref 136–145)
T4 FREE SERPL-MCNC: 0.9 NG/DL (ref 0.71–1.51)
TRIGL SERPL-MCNC: 93 MG/DL (ref 30–150)
TSH SERPL DL<=0.005 MIU/L-ACNC: 3.13 UIU/ML (ref 0.4–4)
WBC # BLD AUTO: 4.47 K/UL (ref 3.9–12.7)

## 2023-10-02 PROCEDURE — 84443 ASSAY THYROID STIM HORMONE: CPT | Performed by: INTERNAL MEDICINE

## 2023-10-02 PROCEDURE — 36415 COLL VENOUS BLD VENIPUNCTURE: CPT | Performed by: INTERNAL MEDICINE

## 2023-10-02 PROCEDURE — 83036 HEMOGLOBIN GLYCOSYLATED A1C: CPT | Performed by: INTERNAL MEDICINE

## 2023-10-02 PROCEDURE — 84439 ASSAY OF FREE THYROXINE: CPT | Performed by: INTERNAL MEDICINE

## 2023-10-02 PROCEDURE — 85025 COMPLETE CBC W/AUTO DIFF WBC: CPT | Performed by: INTERNAL MEDICINE

## 2023-10-02 PROCEDURE — 80053 COMPREHEN METABOLIC PANEL: CPT | Performed by: INTERNAL MEDICINE

## 2023-10-02 PROCEDURE — 80061 LIPID PANEL: CPT | Performed by: INTERNAL MEDICINE

## 2023-10-02 PROCEDURE — 84153 ASSAY OF PSA TOTAL: CPT | Performed by: INTERNAL MEDICINE

## 2023-10-09 ENCOUNTER — OFFICE VISIT (OUTPATIENT)
Dept: PRIMARY CARE CLINIC | Facility: CLINIC | Age: 53
End: 2023-10-09
Payer: MEDICAID

## 2023-10-09 VITALS
SYSTOLIC BLOOD PRESSURE: 130 MMHG | WEIGHT: 206.38 LBS | DIASTOLIC BLOOD PRESSURE: 86 MMHG | BODY MASS INDEX: 30.57 KG/M2 | OXYGEN SATURATION: 97 % | HEIGHT: 69 IN | HEART RATE: 76 BPM

## 2023-10-09 DIAGNOSIS — E78.2 HYPERLIPIDEMIA, MIXED: ICD-10-CM

## 2023-10-09 DIAGNOSIS — Z00.00 ANNUAL PHYSICAL EXAM: Primary | ICD-10-CM

## 2023-10-09 DIAGNOSIS — I10 ESSENTIAL HYPERTENSION: ICD-10-CM

## 2023-10-09 DIAGNOSIS — L28.0 LICHEN SIMPLEX CHRONICUS: ICD-10-CM

## 2023-10-09 DIAGNOSIS — E03.8 SUBCLINICAL HYPOTHYROIDISM: ICD-10-CM

## 2023-10-09 DIAGNOSIS — R73.01 IMPAIRED FASTING GLUCOSE: ICD-10-CM

## 2023-10-09 PROCEDURE — 3008F BODY MASS INDEX DOCD: CPT | Mod: CPTII,,, | Performed by: INTERNAL MEDICINE

## 2023-10-09 PROCEDURE — 3008F PR BODY MASS INDEX (BMI) DOCUMENTED: ICD-10-PCS | Mod: CPTII,,, | Performed by: INTERNAL MEDICINE

## 2023-10-09 PROCEDURE — 99396 PREV VISIT EST AGE 40-64: CPT | Mod: S$PBB,,, | Performed by: INTERNAL MEDICINE

## 2023-10-09 PROCEDURE — 3044F PR MOST RECENT HEMOGLOBIN A1C LEVEL <7.0%: ICD-10-PCS | Mod: CPTII,,, | Performed by: INTERNAL MEDICINE

## 2023-10-09 PROCEDURE — 3079F DIAST BP 80-89 MM HG: CPT | Mod: CPTII,,, | Performed by: INTERNAL MEDICINE

## 2023-10-09 PROCEDURE — 3075F SYST BP GE 130 - 139MM HG: CPT | Mod: CPTII,,, | Performed by: INTERNAL MEDICINE

## 2023-10-09 PROCEDURE — 1159F PR MEDICATION LIST DOCUMENTED IN MEDICAL RECORD: ICD-10-PCS | Mod: CPTII,,, | Performed by: INTERNAL MEDICINE

## 2023-10-09 PROCEDURE — 99999 PR PBB SHADOW E&M-EST. PATIENT-LVL III: ICD-10-PCS | Mod: PBBFAC,,, | Performed by: INTERNAL MEDICINE

## 2023-10-09 PROCEDURE — 99396 PR PREVENTIVE VISIT,EST,40-64: ICD-10-PCS | Mod: S$PBB,,, | Performed by: INTERNAL MEDICINE

## 2023-10-09 PROCEDURE — 1159F MED LIST DOCD IN RCRD: CPT | Mod: CPTII,,, | Performed by: INTERNAL MEDICINE

## 2023-10-09 PROCEDURE — 99213 OFFICE O/P EST LOW 20 MIN: CPT | Mod: PBBFAC | Performed by: INTERNAL MEDICINE

## 2023-10-09 PROCEDURE — 99999 PR PBB SHADOW E&M-EST. PATIENT-LVL III: CPT | Mod: PBBFAC,,, | Performed by: INTERNAL MEDICINE

## 2023-10-09 PROCEDURE — 3079F PR MOST RECENT DIASTOLIC BLOOD PRESSURE 80-89 MM HG: ICD-10-PCS | Mod: CPTII,,, | Performed by: INTERNAL MEDICINE

## 2023-10-09 PROCEDURE — 3044F HG A1C LEVEL LT 7.0%: CPT | Mod: CPTII,,, | Performed by: INTERNAL MEDICINE

## 2023-10-09 PROCEDURE — 3075F PR MOST RECENT SYSTOLIC BLOOD PRESS GE 130-139MM HG: ICD-10-PCS | Mod: CPTII,,, | Performed by: INTERNAL MEDICINE

## 2023-10-09 RX ORDER — IRBESARTAN AND HYDROCHLOROTHIAZIDE 150; 12.5 MG/1; MG/1
1 TABLET, FILM COATED ORAL DAILY
Qty: 90 TABLET | Refills: 3 | Status: SHIPPED | OUTPATIENT
Start: 2023-10-09

## 2023-10-09 NOTE — PROGRESS NOTES
[facility] Note    Chief Complaint      Chief Complaint   Patient presents with    Annual Exam       History of Present Illness      Matthew Phoenix Jr. is a 53 y.o. male with chronic conditions of HTN, HLD, subclinical hypothyroidism, IFG, lichen simplex chronicus who presents today for: annual preventative visit.  HTN: BP at goal on irbesartan-hctz.  HLD: .  Focusing on diet.    The 10-year ASCVD risk score (Angelina SIERRA, et al., 2019) is: 11.3%    Values used to calculate the score:      Age: 53 years      Sex: Male      Is Non- : Yes      Diabetic: No      Tobacco smoker: No      Systolic Blood Pressure: 130 mmHg      Is BP treated: Yes      HDL Cholesterol: 41 mg/dL      Total Cholesterol: 191 mg/dL   IFG: A1C 6.3.    Subclinical hypothyroidism:  TSH at goal last check.  Denies overt hypothyroidism symptoms.  Lichen simplex: Saw Dr. Mason.  Controlled on triamcinolone and cerave cream as needed.   Flu shot declines.  Td 8 yrs ago.  Shingles vaccine due age 49. Pneumonia vaccine due age 64.  COVID UTD.    Cscope 2019, Dr. Metz, no polyps, 10 yr interval.     Past Medical History:  Past Medical History:   Diagnosis Date    Hypertension        Past Surgical History:   has a past surgical history that includes Fracture surgery (Right, 1999).    Family History:  family history includes Diabetes in his sister; Eczema in his daughter; Throat cancer in his father.     Social History:  Social History     Tobacco Use    Smoking status: Never    Smokeless tobacco: Never   Substance Use Topics    Alcohol use: Yes    Drug use: Never       I personally reviewed all past medical, surgical, social and family history.    Review of Systems   Constitutional:  Negative for chills, fever and malaise/fatigue.   Respiratory:  Negative for shortness of breath.    Cardiovascular:  Negative for chest pain.   Gastrointestinal:  Negative for constipation, diarrhea, nausea and vomiting.  "  Skin:  Negative for rash.   Neurological:  Negative for weakness.   All other systems reviewed and are negative.       Medications:  Outpatient Encounter Medications as of 10/9/2023   Medication Sig Dispense Refill    clindamycin (CLEOCIN) 300 MG capsule Take 1 capsule (300 mg total) by mouth every 8 (eight) hours. 21 capsule 0    ibuprofen (ADVIL,MOTRIN) 600 MG tablet Take 1 tablet (600 mg total) by mouth every 6 (six) hours as needed for Pain. 20 tablet 0    irbesartan-hydrochlorothiazide (AVALIDE) 150-12.5 mg per tablet Take 1 tablet by mouth once daily. 90 tablet 3    Lactobacillus rhamnosus GG (CULTURELLE) 10 billion cell capsule Take 1 capsule by mouth 2 (two) times daily.       No facility-administered encounter medications on file as of 10/9/2023.       Allergies:  Review of patient's allergies indicates:   Allergen Reactions    Penicillins        Health Maintenance:  Immunization History   Administered Date(s) Administered    COVID-19, MRNA, LN-S, PF (Pfizer) (Purple Cap) 12/17/2021    COVID-19, vector-nr, rS-Ad26, PF ([employer]) 03/05/2021    Td (ADULT) 09/15/2005      Health Maintenance   Topic Date Due    TETANUS VACCINE  09/15/2015    Shingles Vaccine (1 of 2) Never done    Lipid Panel  10/02/2028    Colorectal Cancer Screening  10/28/2029    Hepatitis C Screening  Completed        Physical Exam      Vital Signs  Pulse: 76  SpO2: 97 %  BP: 130/86  BP Location: Right arm  Patient Position: Sitting  Pain Score: 0-No pain  Height and Weight  Height: 5' 9" (175.3 cm)  Weight: 93.6 kg (206 lb 5.6 oz)  BSA (Calculated - sq m): 2.13 sq meters  BMI (Calculated): 30.5  Weight in (lb) to have BMI = 25: 168.9]    Physical Exam  Vitals reviewed.   Constitutional:       Appearance: He is well-developed.   HENT:      Head: Normocephalic and atraumatic.      Right Ear: External ear normal.      Left Ear: External ear normal.   Cardiovascular:      Rate and Rhythm: Normal rate and regular rhythm.      Heart sounds: " Normal heart sounds. No murmur heard.  Pulmonary:      Effort: Pulmonary effort is normal.      Breath sounds: Normal breath sounds. No wheezing or rales.   Abdominal:      General: Bowel sounds are normal.      Palpations: Abdomen is soft.          Laboratory:  CBC:  Recent Labs   Lab 09/20/21  1042 10/03/22  0829 10/02/23  0929   WBC 4.02 4.09 4.47   RBC 5.16 4.90 4.86   Hemoglobin 15.4 14.5 14.4   Hematocrit 44.8 44.2 43.0   Platelets 255 234 250   MCV 87 90 89   MCH 29.8 29.6 29.6   MCHC 34.4 32.8 33.5     CMP:  Recent Labs   Lab 09/20/21  1042 10/03/22  0829 10/02/23  0929   Glucose 116 H 110 98   Calcium 9.7 9.5 9.5   Albumin 4.5 4.3 4.2   Total Protein 7.8 7.8 7.6   Sodium 142 140 144   Potassium 4.6 4.4 4.6   CO2 25 27 25   Chloride 103 103 112 H   BUN 11 11 11   Alkaline Phosphatase 54 L 54 L 53 L   ALT 31 29 43   AST 27 22 32   Total Bilirubin 0.6 0.5 0.6     URINALYSIS:  Recent Labs   Lab 11/11/20  1239   Color, UA Beba   Specific Gravity, UA 1.030   pH, UA 5.0   Protein, UA Negative   Nitrite, UA Negative   Leukocytes, UA Negative      LIPIDS:  Recent Labs   Lab 09/20/21  1042 10/03/22  0829 10/02/23  0929   TSH 1.446 3.931 3.134   HDL 41 39 L 41   Cholesterol 193 195 191   Triglycerides 107 118 93   LDL Cholesterol 130.6 132.4 131.4   HDL/Cholesterol Ratio 21.2 20.0 21.5   Non-HDL Cholesterol 152 156 150   Total Cholesterol/HDL Ratio 4.7 5.0 4.7     TSH:  Recent Labs   Lab 09/20/21  1042 10/03/22  0829 10/02/23  0929   TSH 1.446 3.931 3.134     A1C:  Recent Labs   Lab 09/20/21  1042 10/03/22  0829 10/02/23  0929   Hemoglobin A1C 6.4 H 6.4 H 6.3 H       Assessment/Plan     Matthew Phoenix  is a 53 y.o.male with:    1. Annual physical exam  Discussed diet and exercise, vaccines and cancer screening, risk factors.  Screening labs ordered.     2. Essential hypertension  Continue current meds.    3. Hyperlipidemia, mixed  Continue diet  4. Impaired fasting glucose  Counseled on diet and exercise  5.  Subclinical hypothyroidism  Continue to monitor  6. Lichen simplex chronicus       Chronic conditions status updated as per HPI.  Other than changes above, cont current medications and maintain follow up with specialists.  Follow up in about 1 year (around 10/9/2024) for Annual preventative visit.    No future appointments.    Jordi White MD  [facility]

## 2024-09-01 DIAGNOSIS — I10 ESSENTIAL HYPERTENSION: ICD-10-CM

## 2024-09-01 DIAGNOSIS — Z00.00 ANNUAL PHYSICAL EXAM: ICD-10-CM

## 2024-09-02 NOTE — TELEPHONE ENCOUNTER
Care Due:                  Date            Visit Type   Department     Provider  --------------------------------------------------------------------------------                                EP -                              PRIMARY      [facility]  Last Visit: 10-      CARE ([facility])   SARBJIT Ferrari  Next Visit: None Scheduled  None         None Found                                                            Last  Test          Frequency    Reason                     Performed    Due Date  --------------------------------------------------------------------------------    CMP.........  12 months..  irbesartan-hydrochlorothi  10-   09-                             azide....................    [employer] Embedded Care Due Messages. Reference number: [id number].   9/01/2024 10:51:17 PM CDT

## 2024-09-03 RX ORDER — IRBESARTAN AND HYDROCHLOROTHIAZIDE 150; 12.5 MG/1; MG/1
1 TABLET, FILM COATED ORAL DAILY
Qty: 90 TABLET | Refills: 1 | Status: SHIPPED | OUTPATIENT
Start: 2024-09-03 | End: 2024-09-04 | Stop reason: SDUPTHER

## 2024-09-04 DIAGNOSIS — Z00.00 ANNUAL PHYSICAL EXAM: ICD-10-CM

## 2024-09-04 DIAGNOSIS — I10 ESSENTIAL HYPERTENSION: ICD-10-CM

## 2024-09-04 RX ORDER — IRBESARTAN AND HYDROCHLOROTHIAZIDE 150; 12.5 MG/1; MG/1
1 TABLET, FILM COATED ORAL DAILY
Qty: 90 TABLET | Refills: 3 | Status: SHIPPED | OUTPATIENT
Start: 2024-09-04

## 2024-10-21 ENCOUNTER — PATIENT MESSAGE (OUTPATIENT)
Dept: PRIMARY CARE CLINIC | Facility: CLINIC | Age: 54
End: 2024-10-21
Payer: MEDICAID

## 2024-10-21 DIAGNOSIS — L30.9 DERMATITIS: ICD-10-CM

## 2024-10-22 RX ORDER — TRIAMCINOLONE ACETONIDE 1 MG/G
CREAM TOPICAL 2 TIMES DAILY
Qty: 80 G | Refills: 6 | Status: SHIPPED | OUTPATIENT
Start: 2024-10-22

## 2024-10-22 NOTE — TELEPHONE ENCOUNTER
No care due was identified.  [facility] Embedded Care Due Messages. Reference number: [id number].   10/22/2024 11:47:17 AM CDT

## 2024-10-22 NOTE — TELEPHONE ENCOUNTER
Pt requesting med refill for eczema, med pended     LOV with Jordi White MD , 10/9/2023, scheduled 11/15/24

## 2024-11-24 NOTE — TELEPHONE ENCOUNTER
----- Message from Perla Isaacs sent at 9/29/2023 12:38 PM CDT -----  Regarding: Appt  Contact: Ross [phone]   Ross/ wife is calling to schedule a appt for pt please call      no edema documented

## 2025-01-27 ENCOUNTER — PATIENT MESSAGE (OUTPATIENT)
Dept: PRIMARY CARE CLINIC | Facility: CLINIC | Age: 55
End: 2025-01-27
Payer: MEDICAID

## 2025-01-27 DIAGNOSIS — L30.9 ECZEMA, UNSPECIFIED TYPE: Primary | ICD-10-CM

## 2025-01-28 NOTE — TELEPHONE ENCOUNTER
Pt requesting a referral to dermatology due to worsening eczema; pended in encounter for your review    LOV 10/9/2023  annual; has pieter scheduled 3/17/2025

## 2025-02-05 ENCOUNTER — PATIENT MESSAGE (OUTPATIENT)
Dept: PRIMARY CARE CLINIC | Facility: CLINIC | Age: 55
End: 2025-02-05
Payer: MEDICAID

## 2025-02-19 ENCOUNTER — PATIENT MESSAGE (OUTPATIENT)
Dept: PRIMARY CARE CLINIC | Facility: CLINIC | Age: 55
End: 2025-02-19

## 2025-02-19 ENCOUNTER — OFFICE VISIT (OUTPATIENT)
Dept: PRIMARY CARE CLINIC | Facility: CLINIC | Age: 55
End: 2025-02-19
Payer: MEDICAID

## 2025-02-19 VITALS
SYSTOLIC BLOOD PRESSURE: 138 MMHG | DIASTOLIC BLOOD PRESSURE: 100 MMHG | WEIGHT: 214.94 LBS | OXYGEN SATURATION: 98 % | BODY MASS INDEX: 31.84 KG/M2 | HEART RATE: 90 BPM | HEIGHT: 69 IN

## 2025-02-19 DIAGNOSIS — Z00.00 ANNUAL PHYSICAL EXAM: Primary | ICD-10-CM

## 2025-02-19 DIAGNOSIS — E03.8 SUBCLINICAL HYPOTHYROIDISM: ICD-10-CM

## 2025-02-19 DIAGNOSIS — E78.2 HYPERLIPIDEMIA, MIXED: ICD-10-CM

## 2025-02-19 DIAGNOSIS — R73.01 IMPAIRED FASTING GLUCOSE: ICD-10-CM

## 2025-02-19 DIAGNOSIS — L28.0 LICHEN SIMPLEX CHRONICUS: ICD-10-CM

## 2025-02-19 DIAGNOSIS — I10 ESSENTIAL HYPERTENSION: ICD-10-CM

## 2025-02-19 PROCEDURE — 99999 PR PBB SHADOW E&M-EST. PATIENT-LVL III: CPT | Mod: PBBFAC,,, | Performed by: INTERNAL MEDICINE

## 2025-02-19 PROCEDURE — 99213 OFFICE O/P EST LOW 20 MIN: CPT | Mod: PBBFAC | Performed by: INTERNAL MEDICINE

## 2025-02-19 NOTE — PROGRESS NOTES
[facility]    Chief Complaint      Chief Complaint   Patient presents with    Annual Exam       History of Present Illness      History of Present Illness    CHIEF COMPLAINT:  Mr. Phoenix presents today for follow-up    HYPERTENSION:  He has a blood pressure cuff at home but does not routinely check blood pressure. He continues to take prescribed blood pressure medications.    WEIGHT HISTORY:  His previous weight was approximately 240 lbs.    DERMATOLOGY:  He recently had his first dermatology consultation.       HTN: BP at goal on irbesartan-hctz.   HLD: .  Focusing on diet.    IFG: A1C 6.3.    Subclinical hypothyroidism:  TSH at goal last check.  Denies overt hypothyroidism symptoms.  Lichen simplex: Saw Dr. Mason.  Controlled on triamcinolone and cerave cream as needed.   Flu shot declines.  Td 8 yrs ago.  Shingles vaccine due age 49. Pneumonia vaccine due age 64.  COVID UTD.    Cscope 2019, Dr. Metz, no polyps, 10 yr interval.     Assessment/Plan     Matthew Phoenix Jr. is a 54 y.o.male with:    Assessment & Plan    Assessed elevated blood pressure, potentially due to recent high-sodium meal  Evaluated overall health status, including heart and lung function, finding no concerning issues  Reviewed colonoscopy schedule, confirming next due in 2029  Determined need for routine lab work including PSA, A1C, and thyroid function tests    HYPERTENSION:  - Evaluated the patient's elevated blood pressure during the visit, noting particularly high diastolic reading.  - Discussed the impact of high-sodium meals on blood pressure readings.  - Instructed the patient to monitor blood pressure at home when calm and not immediately after eating.  - Continued current antihypertensive medication at the same dosage.  - Requested the patient to check blood pressure at home in 2 weeks and respond to message with reading.  - Advised the patient to contact the office if blood pressure remains elevated  at home, as medication dosage may need adjustment.  - Scheduled follow up in 1 year for annual follow-up if blood pressure is well-controlled.  - Will reassess blood pressure in 2 weeks, either through a follow-up visit or home monitoring.    LABS:  - Ordered PSA, A1C, and thyroid function tests.  - Assigned Antonieta to assist with setting up lab work.    ECZEMA:  - Noted that the patient reports having eczema.  - Acknowledged that the patient recently visited a dermatologist for the first time.  - Confirmed that the patient is using topical creams for pruritus management.    OTHER INSTRUCTIONS:  - Mr. Phoenix to continue current exercise routine, including cycling.         1. Annual physical exam  - CBC Auto Differential; Future  - Comprehensive Metabolic Panel; Future  - Lipid Panel; Future  - TSH; Future  - PSA, Screening; Future  - Hemoglobin A1C; Future  - T4, Free; Future    2. Essential hypertension    3. Hyperlipidemia, mixed    4. Subclinical hypothyroidism  - T4, Free; Future    5. Impaired fasting glucose  - Hemoglobin A1C; Future    6. Lichen simplex chronicus      Chronic conditions status updated as per HPI.  Other than changes above, cont current medications and maintain follow up with specialists.  Follow up in about 1 year (around 2/19/2026) for Annual preventative visit.    No future appointments.        Past Medical History:  Past Medical History:   Diagnosis Date    Hypertension        Past Surgical History:   has a past surgical history that includes Fracture surgery (Right, 1999).    Family History:  family history includes Diabetes in his sister; Eczema in his daughter; Throat cancer in his father.     Social History:  Social History[1]    Medications:  Encounter Medications[2]    Allergies:  Review of patient's allergies indicates:   Allergen Reactions    Penicillins        Health Maintenance:  Immunization History   Administered Date(s) Administered    COVID-19, MRNA, LN-S, PF ([employer])  "(Purple Cap) 12/17/2021    COVID-19, vector-nr, rS-Ad26, PF ([employer]) 03/05/2021    Td (ADULT) 09/15/2005      Health Maintenance   Topic Date Due    TETANUS VACCINE  09/15/2015    Shingles Vaccine (1 of 2) Never done    Pneumococcal Vaccines (Age 50+) (1 of 1 - PCV) Never done    Influenza Vaccine (1) Never done    COVID-19 Vaccine (3 - 2024-25 season) 09/01/2024    Hemoglobin A1c (Prediabetes)  10/02/2024    Lipid Panel  10/02/2028    Colorectal Cancer Screening  10/28/2029    RSV Vaccine (Age 60+ and Pregnant patients) (1 - 1-dose 75+ series) 10/06/2044    Hepatitis C Screening  Completed    HIV Screening  Completed        Physical Exam      Vital Signs  Pulse: 90  SpO2: 98 %  BP: (!) 138/100  BP Location: Left arm  Patient Position: Sitting  Pain Score: 0-No pain  Height and Weight  Height: 5' 9" (175.3 cm)  Weight: 97.5 kg (214 lb 15.2 oz)  BSA (Calculated - sq m): 2.18 sq meters  BMI (Calculated): 31.7  Weight in (lb) to have BMI = 25: 168.9]    Physical Exam    Vitals: Weight: 214 lbs. Blood pressure was up a little bit.  General: No acute distress. Well-developed. Well-nourished.  Eyes: EOMI. Sclerae anicteric.  HENT: Normocephalic. Atraumatic. Nares patent. Moist oral mucosa.  Ears: Bilateral TMs clear. Bilateral EACs clear. Ears look great, no wax on either side.  Cardiovascular: Regular rate. Regular rhythm. No murmurs. No rubs. No gallops. Normal S1, S2. Carotid artery sounds clear.  Respiratory: Normal respiratory effort. Clear to auscultation bilaterally. No rales. No rhonchi. No wheezing.  Abdomen: Soft. Non-tender. Non-distended. Normoactive bowel sounds.  Musculoskeletal: No  obvious deformity.  Extremities: No lower extremity edema.  Neurological: Alert & oriented x3. No slurred speech. Normal gait.  Psychiatric: Normal mood. Normal affect. Good insight. Good judgment.  Skin: Warm. Dry. No rash.         Physical Exam    Laboratory:    Results              CBC:  Recent Labs   Lab 10/03/22  0829 " 10/02/23  0929   WBC 4.09 4.47   RBC 4.90 4.86   Hemoglobin 14.5 14.4   Hematocrit 44.2 43.0   Platelets 234 250   MCV 90 89   MCH 29.6 29.6   MCHC 32.8 33.5     CMP:  Recent Labs   Lab 10/03/22  0829 10/02/23  0929   Glucose 110 98   Calcium 9.5 9.5   Albumin 4.3 4.2   Total Protein 7.8 7.6   Sodium 140 144   Potassium 4.4 4.6   CO2 27 25   Chloride 103 112 H   BUN 11 11   Alkaline Phosphatase 54 L 53 L   ALT 29 43   AST 22 32   Total Bilirubin 0.5 0.6     URINALYSIS:       LIPIDS:  Recent Labs   Lab 10/03/22  0829 10/02/23  0929   TSH 3.931 3.134   HDL 39 L 41   Cholesterol 195 191   Triglycerides 118 93   LDL Cholesterol 132.4 131.4   HDL/Cholesterol Ratio 20.0 21.5   Non-HDL Cholesterol 156 150   Total Cholesterol/HDL Ratio 5.0 4.7     TSH:  Recent Labs   Lab 10/03/22  0829 10/02/23  0929   TSH 3.931 3.134     A1C:  Recent Labs   Lab 10/03/22  0829 10/02/23  0929   Hemoglobin A1C 6.4 H 6.3 H           This note was generated with the assistance of ambient listening technology. Verbal consent was obtained by the patient and accompanying visitor(s) for the recording of patient appointment to facilitate this note. I attest to having reviewed and edited the generated note for accuracy, though some syntax or spelling errors may persist. Please contact the author of this note for any clarification.      Jordi White MD  [facility]                  Answers submitted by the patient for this visit:  Review of Systems Questionnaire (Submitted on 2/19/2025)  activity change: No  unexpected weight change: No  neck pain: No  hearing loss: No  rhinorrhea: No  trouble swallowing: No  eye discharge: No  visual disturbance: No  chest tightness: No  wheezing: No  chest pain: No  palpitations: No  blood in stool: No  constipation: No  vomiting: No  diarrhea: No  polydipsia: No  polyuria: No  difficulty urinating: No  urgency: No  hematuria: No  joint swelling: No  arthralgias: No  headaches: No  weakness:  No  dysphoric mood: No         [1]   Social History  Tobacco Use    Smoking status: Never    Smokeless tobacco: Never   Substance Use Topics    Alcohol use: Yes    Drug use: Never   [2]   Outpatient Encounter Medications as of 2/19/2025   Medication Sig Dispense Refill    clindamycin (CLEOCIN) 300 MG capsule Take 1 capsule (300 mg total) by mouth every 8 (eight) hours. (Patient not taking: Reported on 2/19/2025) 21 capsule 0    ibuprofen (ADVIL,MOTRIN) 600 MG tablet Take 1 tablet (600 mg total) by mouth every 6 (six) hours as needed for Pain. (Patient not taking: Reported on 2/19/2025) 20 tablet 0    irbesartan-hydrochlorothiazide (AVALIDE) 150-12.5 mg per tablet Take 1 tablet by mouth once daily. 90 tablet 3    Lactobacillus rhamnosus GG (CULTURELLE) 10 billion cell capsule Take 1 capsule by mouth 2 (two) times daily.      triamcinolone acetonide 0.1% (KENALOG) 0.1 % cream Apply topically 2 (two) times daily. 80 g 6     No facility-administered encounter medications on file as of 2/19/2025.

## 2025-03-18 ENCOUNTER — LAB VISIT (OUTPATIENT)
Dept: LAB | Facility: HOSPITAL | Age: 55
End: 2025-03-18
Attending: INTERNAL MEDICINE
Payer: MEDICAID

## 2025-03-18 DIAGNOSIS — E03.8 SUBCLINICAL HYPOTHYROIDISM: ICD-10-CM

## 2025-03-18 DIAGNOSIS — R73.01 IMPAIRED FASTING GLUCOSE: ICD-10-CM

## 2025-03-18 DIAGNOSIS — Z00.00 ANNUAL PHYSICAL EXAM: ICD-10-CM

## 2025-03-18 LAB
ALBUMIN SERPL BCP-MCNC: 4.2 G/DL (ref 3.5–5.2)
ALP SERPL-CCNC: 78 U/L (ref 40–150)
ALT SERPL W/O P-5'-P-CCNC: 33 U/L (ref 10–44)
ANION GAP SERPL CALC-SCNC: 12 MMOL/L (ref 8–16)
AST SERPL-CCNC: 22 U/L (ref 10–40)
BASOPHILS # BLD AUTO: 0.02 K/UL (ref 0–0.2)
BASOPHILS NFR BLD: 0.4 % (ref 0–1.9)
BILIRUB SERPL-MCNC: 0.5 MG/DL (ref 0.1–1)
BUN SERPL-MCNC: 14 MG/DL (ref 6–20)
CALCIUM SERPL-MCNC: 9.9 MG/DL (ref 8.7–10.5)
CHLORIDE SERPL-SCNC: 105 MMOL/L (ref 95–110)
CHOLEST SERPL-MCNC: 186 MG/DL (ref 120–199)
CHOLEST/HDLC SERPL: 4.9 {RATIO} (ref 2–5)
CO2 SERPL-SCNC: 23 MMOL/L (ref 23–29)
COMPLEXED PSA SERPL-MCNC: 1.6 NG/ML (ref 0–4)
CREAT SERPL-MCNC: 1 MG/DL (ref 0.5–1.4)
DIFFERENTIAL METHOD BLD: ABNORMAL
EOSINOPHIL # BLD AUTO: 0.1 K/UL (ref 0–0.5)
EOSINOPHIL NFR BLD: 1.8 % (ref 0–8)
ERYTHROCYTE [DISTWIDTH] IN BLOOD BY AUTOMATED COUNT: 13.2 % (ref 11.5–14.5)
EST. GFR  (NO RACE VARIABLE): >60 ML/MIN/1.73 M^2
ESTIMATED AVG GLUCOSE: 151 MG/DL (ref 68–131)
GLUCOSE SERPL-MCNC: 100 MG/DL (ref 70–110)
HBA1C MFR BLD: 6.9 % (ref 4–5.6)
HCT VFR BLD AUTO: 42.9 % (ref 40–54)
HDLC SERPL-MCNC: 38 MG/DL (ref 40–75)
HDLC SERPL: 20.4 % (ref 20–50)
HGB BLD-MCNC: 14.4 G/DL (ref 14–18)
IMM GRANULOCYTES # BLD AUTO: 0.03 K/UL (ref 0–0.04)
IMM GRANULOCYTES NFR BLD AUTO: 0.5 % (ref 0–0.5)
LDLC SERPL CALC-MCNC: 110.6 MG/DL (ref 63–159)
LYMPHOCYTES # BLD AUTO: 2.8 K/UL (ref 1–4.8)
LYMPHOCYTES NFR BLD: 49.2 % (ref 18–48)
MCH RBC QN AUTO: 29 PG (ref 27–31)
MCHC RBC AUTO-ENTMCNC: 33.6 G/DL (ref 32–36)
MCV RBC AUTO: 87 FL (ref 82–98)
MONOCYTES # BLD AUTO: 0.6 K/UL (ref 0.3–1)
MONOCYTES NFR BLD: 10.8 % (ref 4–15)
NEUTROPHILS # BLD AUTO: 2.1 K/UL (ref 1.8–7.7)
NEUTROPHILS NFR BLD: 37.3 % (ref 38–73)
NONHDLC SERPL-MCNC: 148 MG/DL
NRBC BLD-RTO: 0 /100 WBC
PLATELET # BLD AUTO: 286 K/UL (ref 150–450)
PMV BLD AUTO: 8.7 FL (ref 9.2–12.9)
POTASSIUM SERPL-SCNC: 4.3 MMOL/L (ref 3.5–5.1)
PROT SERPL-MCNC: 8 G/DL (ref 6–8.4)
RBC # BLD AUTO: 4.96 M/UL (ref 4.6–6.2)
SODIUM SERPL-SCNC: 140 MMOL/L (ref 136–145)
T4 FREE SERPL-MCNC: 0.9 NG/DL (ref 0.71–1.51)
TRIGL SERPL-MCNC: 187 MG/DL (ref 30–150)
TSH SERPL DL<=0.005 MIU/L-ACNC: 3.23 UIU/ML (ref 0.4–4)
WBC # BLD AUTO: 5.67 K/UL (ref 3.9–12.7)

## 2025-03-18 PROCEDURE — 84153 ASSAY OF PSA TOTAL: CPT | Performed by: INTERNAL MEDICINE

## 2025-03-18 PROCEDURE — 83036 HEMOGLOBIN GLYCOSYLATED A1C: CPT | Performed by: INTERNAL MEDICINE

## 2025-03-18 PROCEDURE — 80053 COMPREHEN METABOLIC PANEL: CPT | Performed by: INTERNAL MEDICINE

## 2025-03-18 PROCEDURE — 84443 ASSAY THYROID STIM HORMONE: CPT | Performed by: INTERNAL MEDICINE

## 2025-03-18 PROCEDURE — 84439 ASSAY OF FREE THYROXINE: CPT | Performed by: INTERNAL MEDICINE

## 2025-03-18 PROCEDURE — 85025 COMPLETE CBC W/AUTO DIFF WBC: CPT | Performed by: INTERNAL MEDICINE

## 2025-03-18 PROCEDURE — 36415 COLL VENOUS BLD VENIPUNCTURE: CPT | Performed by: INTERNAL MEDICINE

## 2025-03-18 PROCEDURE — 80061 LIPID PANEL: CPT | Performed by: INTERNAL MEDICINE

## 2025-03-20 ENCOUNTER — PATIENT MESSAGE (OUTPATIENT)
Dept: PRIMARY CARE CLINIC | Facility: CLINIC | Age: 55
End: 2025-03-20
Payer: MEDICAID

## 2025-04-01 ENCOUNTER — RESULTS FOLLOW-UP (OUTPATIENT)
Dept: PRIMARY CARE CLINIC | Facility: CLINIC | Age: 55
End: 2025-04-01
Payer: MEDICAID

## 2025-04-01 DIAGNOSIS — E11.9 TYPE 2 DIABETES MELLITUS WITHOUT COMPLICATION, WITHOUT LONG-TERM CURRENT USE OF INSULIN: Primary | ICD-10-CM

## 2025-04-01 NOTE — PROGRESS NOTES
Labs ok except blood sugar to the point of confirming a diagnosis of Type 2 diabetes.  No medications needed at this point but I would recommend meeting with diabetic educator for diabetes instruction and diet counseling.

## 2025-06-30 ENCOUNTER — PATIENT MESSAGE (OUTPATIENT)
Dept: ADMINISTRATIVE | Facility: HOSPITAL | Age: 55
End: 2025-06-30
Payer: MEDICAID

## 2025-09-04 DIAGNOSIS — Z00.00 ANNUAL PHYSICAL EXAM: ICD-10-CM

## 2025-09-04 DIAGNOSIS — I10 ESSENTIAL HYPERTENSION: ICD-10-CM

## 2025-09-04 RX ORDER — IRBESARTAN AND HYDROCHLOROTHIAZIDE 150; 12.5 MG/1; MG/1
1 TABLET, FILM COATED ORAL DAILY
Qty: 90 TABLET | Refills: 1 | Status: SHIPPED | OUTPATIENT
Start: 2025-09-04